# Patient Record
Sex: FEMALE | Race: WHITE | Employment: OTHER | URBAN - METROPOLITAN AREA
[De-identification: names, ages, dates, MRNs, and addresses within clinical notes are randomized per-mention and may not be internally consistent; named-entity substitution may affect disease eponyms.]

---

## 2017-01-05 ENCOUNTER — LAB REQUISITION (OUTPATIENT)
Dept: LAB | Facility: HOSPITAL | Age: 82
End: 2017-01-05
Attending: INTERNAL MEDICINE
Payer: MEDICARE

## 2017-01-05 DIAGNOSIS — Z79.01 LONG TERM CURRENT USE OF ANTICOAGULANT: ICD-10-CM

## 2017-01-05 DIAGNOSIS — I10 ESSENTIAL (PRIMARY) HYPERTENSION: ICD-10-CM

## 2017-01-05 PROCEDURE — 85610 PROTHROMBIN TIME: CPT | Performed by: INTERNAL MEDICINE

## 2017-01-06 LAB
POC ANTICOAG MACHINE PASS CONTROL: YES
POC INR: 2.6 (ref 0.8–1.3)

## 2017-01-13 ENCOUNTER — HOSPITAL ENCOUNTER (INPATIENT)
Facility: HOSPITAL | Age: 82
LOS: 3 days | Discharge: HOME | DRG: 292 | End: 2017-01-16
Attending: EMERGENCY MEDICINE | Admitting: HOSPITALIST
Payer: MEDICARE

## 2017-01-13 ENCOUNTER — APPOINTMENT (OUTPATIENT)
Dept: GENERAL RADIOLOGY | Facility: HOSPITAL | Age: 82
DRG: 292 | End: 2017-01-13
Payer: MEDICARE

## 2017-01-13 DIAGNOSIS — I50.9 ACUTE ON CHRONIC CONGESTIVE HEART FAILURE, UNSPECIFIED CONGESTIVE HEART FAILURE TYPE: Primary | ICD-10-CM

## 2017-01-13 PROBLEM — D69.6 THROMBOCYTOPENIA (HCC): Status: ACTIVE | Noted: 2017-01-13

## 2017-01-13 PROBLEM — E87.1 HYPONATREMIA: Status: ACTIVE | Noted: 2017-01-13

## 2017-01-13 PROBLEM — E87.6 HYPOKALEMIA: Status: ACTIVE | Noted: 2017-01-13

## 2017-01-13 PROBLEM — R73.9 HYPERGLYCEMIA: Status: ACTIVE | Noted: 2017-01-13

## 2017-01-13 LAB
ALBUMIN SERPL-MCNC: 3.3 G/DL (ref 3.5–4.8)
ALP LIVER SERPL-CCNC: 88 U/L (ref 55–142)
ALT SERPL-CCNC: 15 U/L (ref 14–54)
AST SERPL-CCNC: 14 U/L (ref 15–41)
ATRIAL RATE: 60 BPM
BASOPHIL % MANUAL: 0 %
BASOPHIL ABSOLUTE MANUAL: 0 X10(3) UL (ref 0–0.1)
BILIRUB SERPL-MCNC: 0.7 MG/DL (ref 0.1–2)
BUN BLD-MCNC: 21 MG/DL (ref 8–20)
CALCIUM BLD-MCNC: 9 MG/DL (ref 8.3–10.3)
CHLORIDE: 97 MMOL/L (ref 101–111)
CO2: 31 MMOL/L (ref 22–32)
CREAT BLD-MCNC: 0.86 MG/DL (ref 0.55–1.02)
EOSINOPHIL % MANUAL: 0 %
EOSINOPHIL ABSOLUTE MANUAL: 0 X10(3) UL (ref 0–0.3)
ERYTHROCYTE [DISTWIDTH] IN BLOOD BY AUTOMATED COUNT: 14.3 % (ref 11.5–16)
GLUCOSE BLD-MCNC: 120 MG/DL (ref 70–99)
HCT VFR BLD AUTO: 37.3 % (ref 34–50)
HGB BLD-MCNC: 13.1 G/DL (ref 12–16)
INR BLD: 1.94 (ref 0.89–1.12)
LYMPHOCYTE % MANUAL: 52 %
LYMPHOCYTE ABSOLUTE MANUAL: 5.25 X10(3) UL (ref 0.9–4)
M PROTEIN MFR SERPL ELPH: 6.5 G/DL (ref 6.1–8.3)
MCH RBC QN AUTO: 31 PG (ref 27–33.2)
MCHC RBC AUTO-ENTMCNC: 35.1 G/DL (ref 31–37)
MCV RBC AUTO: 88.4 FL (ref 81–100)
MONOCYTE % MANUAL: 4 %
MONOCYTE ABSOLUTE MANUAL: 0.4 X10(3) UL (ref 0.1–0.6)
NEUTROPHIL ABS PRELIM: 3.56 X10 (3) UL (ref 1.3–6.7)
NEUTROPHIL ABSOLUTE MANUAL: 4.44 X10(3) UL (ref 1.3–6.7)
NEUTROPHILS % MANUAL: 44 %
PLATELET # BLD AUTO: 105 10(3)UL (ref 150–450)
PLATELET MORPHOLOGY: NORMAL
POTASSIUM SERPL-SCNC: 3.5 MMOL/L (ref 3.6–5.1)
PSA SERPL DL<=0.01 NG/ML-MCNC: 22.8 SECONDS (ref 12.3–14.8)
Q-T INTERVAL: 430 MS
QRS DURATION: 104 MS
QTC CALCULATION (BEZET): 440 MS
R AXIS: 11 DEGREES
RBC # BLD AUTO: 4.22 X10(6)UL (ref 3.8–5.1)
RED CELL DISTRIBUTION WIDTH-SD: 45.9 FL (ref 35.1–46.3)
SODIUM SERPL-SCNC: 135 MMOL/L (ref 136–144)
T AXIS: -13 DEGREES
TOTAL CELLS COUNTED: 100
TROPONIN: <0.046 NG/ML (ref ?–0.05)
VENTRICULAR RATE: 63 BPM
WBC # BLD AUTO: 10.1 X10(3) UL (ref 4–13)

## 2017-01-13 PROCEDURE — 80053 COMPREHEN METABOLIC PANEL: CPT

## 2017-01-13 PROCEDURE — 85025 COMPLETE CBC W/AUTO DIFF WBC: CPT

## 2017-01-13 PROCEDURE — 71010 XR CHEST AP PORTABLE  (CPT=71010): CPT

## 2017-01-13 PROCEDURE — 84484 ASSAY OF TROPONIN QUANT: CPT

## 2017-01-13 PROCEDURE — 99285 EMERGENCY DEPT VISIT HI MDM: CPT

## 2017-01-13 PROCEDURE — 93005 ELECTROCARDIOGRAM TRACING: CPT

## 2017-01-13 PROCEDURE — 93010 ELECTROCARDIOGRAM REPORT: CPT

## 2017-01-13 PROCEDURE — 85007 BL SMEAR W/DIFF WBC COUNT: CPT

## 2017-01-13 PROCEDURE — 85027 COMPLETE CBC AUTOMATED: CPT

## 2017-01-13 PROCEDURE — 96374 THER/PROPH/DIAG INJ IV PUSH: CPT

## 2017-01-13 PROCEDURE — 85610 PROTHROMBIN TIME: CPT | Performed by: INTERNAL MEDICINE

## 2017-01-13 RX ORDER — OLMESARTAN MEDOXOMIL 40 MG/1
40 TABLET ORAL DAILY
COMMUNITY
End: 2017-09-15

## 2017-01-13 RX ORDER — ONDANSETRON 2 MG/ML
4 INJECTION INTRAMUSCULAR; INTRAVENOUS EVERY 6 HOURS PRN
Status: DISCONTINUED | OUTPATIENT
Start: 2017-01-13 | End: 2017-01-16

## 2017-01-13 RX ORDER — HYDROCHLOROTHIAZIDE 25 MG/1
25 TABLET ORAL DAILY
Status: DISCONTINUED | OUTPATIENT
Start: 2017-01-14 | End: 2017-01-16

## 2017-01-13 RX ORDER — WARFARIN SODIUM 6 MG/1
6 TABLET ORAL AS DIRECTED
COMMUNITY
End: 2017-03-25

## 2017-01-13 RX ORDER — FUROSEMIDE 10 MG/ML
20 INJECTION INTRAMUSCULAR; INTRAVENOUS
Status: DISCONTINUED | OUTPATIENT
Start: 2017-01-13 | End: 2017-01-16

## 2017-01-13 RX ORDER — PAROXETINE 10 MG/1
10 TABLET, FILM COATED ORAL NIGHTLY
COMMUNITY
End: 2017-03-01

## 2017-01-13 RX ORDER — WARFARIN SODIUM 4 MG/1
4 TABLET ORAL DAILY
Status: ON HOLD | COMMUNITY
End: 2017-09-25

## 2017-01-13 RX ORDER — DILTIAZEM HYDROCHLORIDE 180 MG/1
180 CAPSULE, COATED, EXTENDED RELEASE ORAL DAILY
COMMUNITY
End: 2017-03-01

## 2017-01-13 RX ORDER — WARFARIN SODIUM 2 MG/1
4 TABLET ORAL ONCE
Status: COMPLETED | OUTPATIENT
Start: 2017-01-13 | End: 2017-01-13

## 2017-01-13 RX ORDER — POTASSIUM CHLORIDE 20 MEQ/1
40 TABLET, EXTENDED RELEASE ORAL EVERY 4 HOURS
Status: COMPLETED | OUTPATIENT
Start: 2017-01-13 | End: 2017-01-13

## 2017-01-13 RX ORDER — TRAZODONE HYDROCHLORIDE 50 MG/1
100 TABLET ORAL NIGHTLY
COMMUNITY
End: 2017-03-01

## 2017-01-13 RX ORDER — ACETAMINOPHEN 325 MG/1
650 TABLET ORAL EVERY 6 HOURS PRN
Status: DISCONTINUED | OUTPATIENT
Start: 2017-01-13 | End: 2017-01-16

## 2017-01-13 RX ORDER — POLYETHYLENE GLYCOL 3350 17 G/17G
17 POWDER, FOR SOLUTION ORAL DAILY PRN
Status: DISCONTINUED | OUTPATIENT
Start: 2017-01-13 | End: 2017-01-16

## 2017-01-13 RX ORDER — HYDRALAZINE HYDROCHLORIDE 20 MG/ML
10 INJECTION INTRAMUSCULAR; INTRAVENOUS EVERY 6 HOURS PRN
Status: DISCONTINUED | OUTPATIENT
Start: 2017-01-13 | End: 2017-01-14

## 2017-01-13 RX ORDER — HYDROCHLOROTHIAZIDE 25 MG/1
25 TABLET ORAL DAILY
Status: ON HOLD | COMMUNITY
End: 2017-01-16

## 2017-01-13 RX ORDER — FUROSEMIDE 10 MG/ML
80 INJECTION INTRAMUSCULAR; INTRAVENOUS ONCE
Status: COMPLETED | OUTPATIENT
Start: 2017-01-13 | End: 2017-01-13

## 2017-01-13 RX ORDER — VIT A/VIT C/VIT E/ZINC/COPPER 2148-113
1 TABLET ORAL 2 TIMES DAILY
Status: ON HOLD | COMMUNITY
End: 2018-01-01

## 2017-01-13 RX ORDER — VALSARTAN 320 MG/1
160 TABLET ORAL 2 TIMES DAILY
Status: DISCONTINUED | OUTPATIENT
Start: 2017-01-13 | End: 2017-01-16

## 2017-01-13 RX ORDER — DILTIAZEM HYDROCHLORIDE 180 MG/1
180 CAPSULE, EXTENDED RELEASE ORAL DAILY
Status: DISCONTINUED | OUTPATIENT
Start: 2017-01-14 | End: 2017-01-16

## 2017-01-13 NOTE — ED PROVIDER NOTES
Patient Seen in: [facility] Emergency Department    History   Patient presents with:  Dyspnea NORMAN SOB (respiratory)    Stated Complaint: Shortness of breath    HPI    30-year-old female that comes the hospital the chief complaint of having difficult Tab,  TAKE ONE TABLET BY MOUTH TWICE DAILY. Warfarin Sodium (COUMADIN) 4 MG Oral Tab,  TAKE 1& 1/2 TABLETS BY MOUTH DAILY OR AS DIRECTED BY ANTICOAGULATION CLINIC.    CARTIA  MG Oral Capsule SR 24 Hr,  TAKE ONE CAPSULE BY MOUTH DAILY   predniSONE (D tenderness  Extremity no clubbing, cyanosis or edema noted.   Full range of motion noted without tenderness  Neuro: No focal deficits noted    ED Course     Labs Reviewed   COMP METABOLIC PANEL (14) - Abnormal; Notable for the following:     Glucose 120 (*) was obtained.     COMPARISON:  [facility] , CHEST AP PORT, 2/10/2012, 15:03.  [facility] , CHEST PA   LATERAL, 3/03/2009, 9:44.     INDICATIONS:  Shortness of breath     PATIENT STATED HISTORY:  Shortness of breath for two months.          FINDINGS: Rosa Isela knapp card

## 2017-01-13 NOTE — PROGRESS NOTES
Brief Internal Medicine Note    Full Note to Follow      Pt is a 81 yo with HTN/HL, afib, moderate MR, CLL, osteoporosis, who is admitted for GATES. Pt has been sob for the past few months and has been unchanged.   Per daughter, she just found about it now

## 2017-01-13 NOTE — ED INITIAL ASSESSMENT (HPI)
Pt c/o SOB for two months pt went to PCP who ordered labs and cxr and bnp was 3,000 pt sees DR. Waleska Lee for card

## 2017-01-13 NOTE — H&P
[facility] Hospitalist H&P       CC: sob    PCP: Tawanna Méndez MD    History of Present Illness:     Pt is a 79 yo with HTN/HL, afib, moderate MR, CLL, osteoporosis, who is admitted for GATES.  Pt has been sob for the past few months and has been unchanged.   chest pain, syncope.        OBJECTIVE:  /72 mmHg  Pulse 68  Temp(Src) 98.3 °F (36.8 °C) (Temporal)  Resp 18  Ht 162.6 cm (5' 4\")  Wt 160 lb (72.576 kg)  BMI 27.45 kg/m2  SpO2 94%  General:  Alert, NAD, appears stated age   Head:  Normocephalic, witho Senescent changes are redemonstrated along the thoracic aorta. Small right-sided pleural effusion appears new. The remainder of the lungs are clear. 1/12/2017  IMPRESSION: Small right-sided pleural effusion, new since previous study.   Clinical correlat coumadin        Outpatient records or previous hospital records reviewed. Further recommendations pending patient's clinical course.   [facility] hospitalist to continue to follow patient while in house    Patient and/or patient's family given opportunity to a

## 2017-01-13 NOTE — ED NOTES
Patient and patient daughter updated with plan of care and waiting for transport, pt ambulated to restroom at this time with assistaince, NAD noted.

## 2017-01-14 ENCOUNTER — APPOINTMENT (OUTPATIENT)
Dept: CV DIAGNOSTICS | Facility: HOSPITAL | Age: 82
DRG: 292 | End: 2017-01-14
Attending: INTERNAL MEDICINE
Payer: MEDICARE

## 2017-01-14 LAB
BASOPHILS # BLD AUTO: 0.04 X10(3) UL (ref 0–0.1)
BASOPHILS NFR BLD AUTO: 0.4 %
BUN BLD-MCNC: 17 MG/DL (ref 8–20)
CALCIUM BLD-MCNC: 9.1 MG/DL (ref 8.3–10.3)
CHLORIDE: 99 MMOL/L (ref 101–111)
CO2: 30 MMOL/L (ref 22–32)
CREAT BLD-MCNC: 0.64 MG/DL (ref 0.55–1.02)
EOSINOPHIL # BLD AUTO: 0.05 X10(3) UL (ref 0–0.3)
EOSINOPHIL NFR BLD AUTO: 0.5 %
ERYTHROCYTE [DISTWIDTH] IN BLOOD BY AUTOMATED COUNT: 14.4 % (ref 11.5–16)
GLUCOSE BLD-MCNC: 108 MG/DL (ref 70–99)
HAV IGM SER QL: 2 MG/DL (ref 1.7–3)
HCT VFR BLD AUTO: 41.1 % (ref 34–50)
HGB BLD-MCNC: 14.2 G/DL (ref 12–16)
IMMATURE GRANULOCYTE COUNT: 0.05 X10(3) UL (ref 0–1)
IMMATURE GRANULOCYTE RATIO %: 0.5 %
INR BLD: 2.05 (ref 0.89–1.12)
LYMPHOCYTES # BLD AUTO: 6.18 X10(3) UL (ref 0.9–4)
LYMPHOCYTES NFR BLD AUTO: 57.9 %
MCH RBC QN AUTO: 30.4 PG (ref 27–33.2)
MCHC RBC AUTO-ENTMCNC: 34.5 G/DL (ref 31–37)
MCV RBC AUTO: 88 FL (ref 81–100)
MONOCYTES # BLD AUTO: 0.81 X10(3) UL (ref 0.1–0.6)
MONOCYTES NFR BLD AUTO: 7.6 %
NEUTROPHIL ABS PRELIM: 3.55 X10 (3) UL (ref 1.3–6.7)
NEUTROPHILS # BLD AUTO: 3.55 X10(3) UL (ref 1.3–6.7)
NEUTROPHILS NFR BLD AUTO: 33.1 %
PLATELET # BLD AUTO: 113 10(3)UL (ref 150–450)
POTASSIUM SERPL-SCNC: 3.6 MMOL/L (ref 3.6–5.1)
POTASSIUM SERPL-SCNC: 4.3 MMOL/L (ref 3.6–5.1)
PSA SERPL DL<=0.01 NG/ML-MCNC: 23.9 SECONDS (ref 12.3–14.8)
RBC # BLD AUTO: 4.67 X10(6)UL (ref 3.8–5.1)
RED CELL DISTRIBUTION WIDTH-SD: 46.2 FL (ref 35.1–46.3)
SODIUM SERPL-SCNC: 138 MMOL/L (ref 136–144)
WBC # BLD AUTO: 10.7 X10(3) UL (ref 4–13)

## 2017-01-14 PROCEDURE — 83735 ASSAY OF MAGNESIUM: CPT | Performed by: HOSPITALIST

## 2017-01-14 PROCEDURE — 85610 PROTHROMBIN TIME: CPT | Performed by: HOSPITALIST

## 2017-01-14 PROCEDURE — 80048 BASIC METABOLIC PNL TOTAL CA: CPT | Performed by: INTERNAL MEDICINE

## 2017-01-14 PROCEDURE — 84132 ASSAY OF SERUM POTASSIUM: CPT | Performed by: INTERNAL MEDICINE

## 2017-01-14 PROCEDURE — 85025 COMPLETE CBC W/AUTO DIFF WBC: CPT | Performed by: HOSPITALIST

## 2017-01-14 RX ORDER — POTASSIUM CHLORIDE 20 MEQ/1
40 TABLET, EXTENDED RELEASE ORAL EVERY 4 HOURS
Status: COMPLETED | OUTPATIENT
Start: 2017-01-14 | End: 2017-01-14

## 2017-01-14 RX ORDER — HYDRALAZINE HYDROCHLORIDE 25 MG/1
25 TABLET, FILM COATED ORAL EVERY 8 HOURS SCHEDULED
Status: DISCONTINUED | OUTPATIENT
Start: 2017-01-14 | End: 2017-01-16

## 2017-01-14 NOTE — PLAN OF CARE
Problem: METABOLIC/FLUID AND ELECTROLYTES - ADULT  Goal: Electrolytes maintained within normal limits  INTERVENTIONS:  - Monitor labs and rhythm and assess patient for signs and symptoms of electrolyte imbalances  - Administer electrolyte replacement as or

## 2017-01-14 NOTE — DIETARY NOTE
Nutrition Short Note    Received consult for heart failure diet education. Met with pt and her daughter. Pt reports fair to good appetite.   She lives in an assisted living facility and prepares her own breakfast and lunch which are low in sodium and then

## 2017-01-14 NOTE — PROGRESS NOTES
[facility] Dosing Service  Warfarin (Coumadin) Subsequent Dosing    Mauricio Batres is a 80year old female for whom pharmacy has been dosing warfarin (Coumadin).  Goal INR is 2-3    Recent Labs   Lab  01/13/17   1937  01/14/17   0540   INR  1.94*  2.05*

## 2017-01-14 NOTE — CM/SW NOTE
SW spoke with patient to assess. Patient identified she lives at [address] [place] [address] on the second floor (there is an elevator). Patient uses a standard walker at home.   Patient reports being independent in ADL's prior to admission, stating, \"I can do ev

## 2017-01-14 NOTE — CONSULTS
[facility]  Report of Consultation    Kaylynn Noonan Patient Status:  Observation    1924 MRN [id number]   [facility] 2NE-A Attending Ml Ambrocio MD   Hosp Day # 0 PCP Donell Singleton MD     Reason for Consultation:  SOB \"shallow breathing\". She finds it difficult to describe any further than this, denying that it is air hunger. She has had no orthopnea, PND. She believes she may have some right ankle edema.   She mentioned it to her daughter this week, who brought her magnesium hydroxide (MILK OF MAGNESIA) 400 MG/5ML suspension 30 mL, 30 mL, Oral, Daily PRN  •  ondansetron HCl (ZOFRAN) injection 4 mg, 4 mg, Intravenous, Q6H PRN  •  Potassium Chloride ER (K-DUR M20) CR tab 40 mEq, 40 mEq, Oral, Q4H    Review of Systems: PT 34.3* 02/13/2012   PT 33.7* 02/12/2012   INR 2.6* 01/05/2017   INR 4.2* 12/15/2016   INR 3.36* 12/07/2016           Katelyn Garcia MD  1/13/2017  6:13 PM

## 2017-01-14 NOTE — CONSULTS
[facility] Dosing Service  Warfarin (Coumadin) Initial Dosing    Jerrod Mcclelland is a 80year old female for whom pharmacy has been consulted to dose warfarin (COUMADIN) for atrial fibrillation by Dr. Lisbeth Casiano. Based on this indication, goal INR is 2-3.

## 2017-01-14 NOTE — PROGRESS NOTES
[facility] Hospitalist Progress Note     PCP: Kendal Green MD    CC:  Follow up    SUBJECTIVE:  Pt sitting up in bed, did not sleep well overnight d/t uncomfortable bed. No current cp. No sob at rest, has not walked yet. No n/v/f/c.   Diuresed overnight and 9.0  9.1   MG   --    --   2.0   GLU  111*  120*  108*       Recent Labs   Lab  01/13/17   1503   ALT  15   AST  14*   ALB  3.3*       No results for input(s): PGLU in the last 72 hours.     Recent Labs   Lab  01/13/17   1503   TROP  <0.046         Meds:

## 2017-01-14 NOTE — PLAN OF CARE
Maintains optimal cardiac output and hemodynamic stability Progressing      Absence of cardiac arrhythmias or at baseline Progressing      Achieve highest/safest level of independence in self care Progressing      Electrolytes maintained within normal limi

## 2017-01-14 NOTE — PLAN OF CARE
Problem: CARDIOVASCULAR - ADULT  Goal: Maintains optimal cardiac output and hemodynamic stability  INTERVENTIONS:  - Monitor vital signs, rhythm, and trends  - Monitor for bleeding, hypotension and signs of decreased cardiac output  - Evaluate effectivenes imbalances  - Administer electrolyte replacement as ordered  - Monitor response to electrolyte replacements, including rhythm and repeat lab results as appropriate  - Fluid restriction as ordered  - Instruct patient on fluid and nutrition restrictions as a

## 2017-01-14 NOTE — PLAN OF CARE
Problem: CARDIOVASCULAR - ADULT  Goal: Absence of cardiac arrhythmias or at baseline  INTERVENTIONS:  - Continuous cardiac monitoring, monitor vital signs, obtain 12 lead EKG if indicated  - Initiate emergency measures for life threatening arrhythmias  - M

## 2017-01-14 NOTE — PROGRESS NOTES
[facility] Cardiology Progress Note - German Model Patient Status:  Observation    1924 MRN [id number]   [facility] 2NE-A Attending Debra Aquino MD   Hosp Day # 1 PCP Nicholas Pappas MD     Subjective:   The pa mmHg    Intake/Output:     Intake/Output Summary (Last 24 hours) at 01/14/17 0729  Last data filed at 01/14/17 0442   Gross per 24 hour   Intake    300 ml   Output   2600 ml   Net  -2300 ml       Last 3 Weights  01/14/17 0442 : 152 lb 5.4 oz (69.1 kg)  01/ Allergies    Medications:    Current Facility-Administered Medications:  acetaminophen (TYLENOL) tab 650 mg 650 mg Oral Q6H PRN   PEG 3350 (MIRALAX) powder packet 17 g 17 g Oral Daily PRN   magnesium hydroxide (MILK OF MAGNESIA) 400 MG/5ML suspension 30 mL

## 2017-01-15 ENCOUNTER — APPOINTMENT (OUTPATIENT)
Dept: CV DIAGNOSTICS | Facility: HOSPITAL | Age: 82
DRG: 292 | End: 2017-01-15
Attending: INTERNAL MEDICINE
Payer: MEDICARE

## 2017-01-15 LAB
BUN BLD-MCNC: 17 MG/DL (ref 8–20)
CALCIUM BLD-MCNC: 8.8 MG/DL (ref 8.3–10.3)
CHLORIDE: 99 MMOL/L (ref 101–111)
CO2: 29 MMOL/L (ref 22–32)
CREAT BLD-MCNC: 0.79 MG/DL (ref 0.55–1.02)
GLUCOSE BLD-MCNC: 100 MG/DL (ref 70–99)
INR BLD: 2.35 (ref 0.89–1.12)
POTASSIUM SERPL-SCNC: 3.9 MMOL/L (ref 3.6–5.1)
PSA SERPL DL<=0.01 NG/ML-MCNC: 26.6 SECONDS (ref 12.3–14.8)
SODIUM SERPL-SCNC: 135 MMOL/L (ref 136–144)

## 2017-01-15 PROCEDURE — 93306 TTE W/DOPPLER COMPLETE: CPT

## 2017-01-15 PROCEDURE — 80048 BASIC METABOLIC PNL TOTAL CA: CPT | Performed by: INTERNAL MEDICINE

## 2017-01-15 PROCEDURE — 85610 PROTHROMBIN TIME: CPT | Performed by: HOSPITALIST

## 2017-01-15 PROCEDURE — 93306 TTE W/DOPPLER COMPLETE: CPT | Performed by: INTERNAL MEDICINE

## 2017-01-15 RX ORDER — TRAZODONE HYDROCHLORIDE 50 MG/1
25 TABLET ORAL ONCE
Status: COMPLETED | OUTPATIENT
Start: 2017-01-15 | End: 2017-01-15

## 2017-01-15 RX ORDER — WARFARIN SODIUM 2 MG/1
4 TABLET ORAL
Status: COMPLETED | OUTPATIENT
Start: 2017-01-15 | End: 2017-01-15

## 2017-01-15 NOTE — PROGRESS NOTES
[facility] Cardiology Progress Note - [place] Model Patient Status:  Inpatient    1924 MRN [id number]   [facility] 2NE-A Attending Debra Aquino MD   Hosp Day # 2 PCP Nicholas Pappas MD     Subjective:   The brad kg)      Tele: NSR    Physical Exam:    General: Alert and oriented x 3. No apparent distress. No respiratory or constitutional distress. HEENT: Normocephalic, anicteric sclera, neck supple, no thyromegaly or adenopathy.   Neck: No JVD, carotids 2+, no bru Oral Q6H PRN   PEG 3350 (MIRALAX) powder packet 17 g 17 g Oral Daily PRN   magnesium hydroxide (MILK OF MAGNESIA) 400 MG/5ML suspension 30 mL 30 mL Oral Daily PRN   ondansetron HCl (ZOFRAN) injection 4 mg 4 mg Intravenous Q6H PRN   furosemide (LASIX) injec

## 2017-01-15 NOTE — PROGRESS NOTES
[facility] Dosing Service  Warfarin (Coumadin) Subsequent Dosing    Marilu Leslie is a 80year old female for whom pharmacy has been dosing warfarin (Coumadin).  Goal INR is 2-3    Recent Labs   Lab  01/13/17   1937  01/14/17   0540  01/15/17   0599

## 2017-01-15 NOTE — PLAN OF CARE
Problem: CARDIOVASCULAR - ADULT  Goal: Maintains optimal cardiac output and hemodynamic stability  INTERVENTIONS:  - Monitor vital signs, rhythm, and trends  - Monitor for bleeding, hypotension and signs of decreased cardiac output  - Evaluate effectivenes replacements, including rhythm and repeat lab results as appropriate  - Fluid restriction as ordered  - Instruct patient on fluid and nutrition restrictions as appropriate   Outcome: Progressing    Problem: Impaired Activities of Daily Living  Goal: Achiev

## 2017-01-15 NOTE — PROGRESS NOTES
[facility] Hospitalist Progress Note                                                                   [facility]  1/17/1924    CC: FU SOB    Interval History:  - Breathing not yet back to ba BUN  21*  17   --   17   CREATSERUM  0.86  0.64   --   0.79   CA  9.0  9.1   --   8.8   NA  135*  138   --   135*   K  3.5*  3.6  4.3  3.9   CL  97*  99*   --   99*   CO2  31.0  30.0   --   29.0       ROS: no change to ROS from my documentation yesterday

## 2017-01-15 NOTE — CERTIFICATION
**Certification    PHYSICIAN Certification of Need for Inpatient Hospitalization    Based on the her current state of illness, Lauro requires inpatient hospitalization for her CHF.   This requires inpatient medical treatment because the patient's s

## 2017-01-16 VITALS
HEIGHT: 64 IN | OXYGEN SATURATION: 96 % | TEMPERATURE: 98 F | DIASTOLIC BLOOD PRESSURE: 94 MMHG | BODY MASS INDEX: 25.82 KG/M2 | RESPIRATION RATE: 16 BRPM | WEIGHT: 151.25 LBS | HEART RATE: 67 BPM | SYSTOLIC BLOOD PRESSURE: 137 MMHG

## 2017-01-16 LAB
BUN BLD-MCNC: 31 MG/DL (ref 8–20)
CALCIUM BLD-MCNC: 8.9 MG/DL (ref 8.3–10.3)
CHLORIDE: 96 MMOL/L (ref 101–111)
CO2: 33 MMOL/L (ref 22–32)
CREAT BLD-MCNC: 1.12 MG/DL (ref 0.55–1.02)
GLUCOSE BLD-MCNC: 99 MG/DL (ref 70–99)
INR BLD: 2.44 (ref 0.89–1.12)
POTASSIUM SERPL-SCNC: 3.6 MMOL/L (ref 3.6–5.1)
PSA SERPL DL<=0.01 NG/ML-MCNC: 27.4 SECONDS (ref 12.3–14.8)
SODIUM SERPL-SCNC: 136 MMOL/L (ref 136–144)

## 2017-01-16 PROCEDURE — 80048 BASIC METABOLIC PNL TOTAL CA: CPT | Performed by: INTERNAL MEDICINE

## 2017-01-16 PROCEDURE — 85610 PROTHROMBIN TIME: CPT | Performed by: INTERNAL MEDICINE

## 2017-01-16 RX ORDER — FUROSEMIDE 20 MG/1
20 TABLET ORAL DAILY
Qty: 90 TABLET | Refills: 1 | Status: SHIPPED | OUTPATIENT
Start: 2017-01-16 | End: 2017-06-10

## 2017-01-16 RX ORDER — WARFARIN SODIUM 2 MG/1
4 TABLET ORAL
Status: DISCONTINUED | OUTPATIENT
Start: 2017-01-16 | End: 2017-01-16

## 2017-01-16 RX ORDER — POTASSIUM CHLORIDE 20 MEQ/1
40 TABLET, EXTENDED RELEASE ORAL EVERY 4 HOURS
Status: COMPLETED | OUTPATIENT
Start: 2017-01-16 | End: 2017-01-16

## 2017-01-16 NOTE — CM/SW NOTE
Patient discussed with RN. Per RN, anticipate patient will be ready for discharge home later today. Patient live in an independent apartment at [address] [place] [address]. No discharge needs identified.     [place], [facility]

## 2017-01-16 NOTE — PROGRESS NOTES
[facility] Dosing Service  Warfarin (Coumadin) Subsequent Dosing    Art Ordaz is a 80year old female for whom pharmacy has been dosing warfarin (Coumadin).  Goal INR is 2-3    Recent Labs   Lab  01/13/17   1937  01/14/17   0540  01/15/17   0544  0

## 2017-01-16 NOTE — DISCHARGE SUMMARY
General Medicine Discharge Summary     Patient ID:  Anabell Granados  80year old  1/17/1924    Admit date: 1/13/2017    Discharge date and time:  1/16/17    Attending Physician: Raissa Mcgrath, CONSULT TO SOCIAL WORK  IP CONSULT TO CARDIAC REHAB    Operative Procedures:         Patient instructions:      Current Discharge Medication List    START taking these medications    furosemide 20 MG Oral Tab  Take 1 tablet (20 mg total) by mouth daily.

## 2017-01-16 NOTE — PLAN OF CARE
Patient is alert and oriented. Saturates well on room air. Controlled Afib on the monitor, currently on coumadin. Denies any pain or SOB. Ambulates with stand by assist. Call light within reach, will continue to monitor.      CARDIOVASCULAR - ADULT    • Domonique

## 2017-01-16 NOTE — PROGRESS NOTES
[facility] Cardiology Progress Note - Jam German Patient Status:  Inpatient    1924 MRN [id number]   [facility] 2NE-A Attending Estevan Reddy,*   Hosp Day # 3 PCP Jamisno Wnag MD     Subjective: oz (72.666 kg)  10/28/15 1501 : 167 lb 8 oz (75.978 kg)      Tele: NSR    Physical Exam:    General: Alert and oriented x 3. No apparent distress. No respiratory or constitutional distress.   HEENT: Normocephalic, anicteric sclera, neck supple, no thyromega Phenylephrine-Mineral Oil-Pet (FORMULATION R) rectal ointment  Rectal BID   acetaminophen (TYLENOL) tab 650 mg 650 mg Oral Q6H PRN   PEG 3350 (MIRALAX) powder packet 17 g 17 g Oral Daily PRN   magnesium hydroxide (MILK OF MAGNESIA) 400 MG/5ML suspension

## 2017-01-17 NOTE — CM/SW NOTE
01/17/17 0800   Discharge disposition   Discharged to: Home or [address] [place] [address] after discharge None   Patient assessed for rehabilitation services?  Yes   Discharge transportation Private car   Patient discharged 1/16/17

## 2017-01-23 PROBLEM — R53.83 FATIGUE: Status: ACTIVE | Noted: 2017-01-23

## 2017-02-01 ENCOUNTER — LAB REQUISITION (OUTPATIENT)
Dept: LAB | Facility: HOSPITAL | Age: 82
End: 2017-02-01
Attending: INTERNAL MEDICINE
Payer: MEDICARE

## 2017-02-01 DIAGNOSIS — I50.9 HEART FAILURE (HCC): ICD-10-CM

## 2017-02-01 DIAGNOSIS — R53.82 CHRONIC FATIGUE: ICD-10-CM

## 2017-02-01 LAB
BUN BLD-MCNC: 13 MG/DL (ref 8–20)
CALCIUM BLD-MCNC: 8.9 MG/DL (ref 8.3–10.3)
CHLORIDE: 100 MMOL/L (ref 101–111)
CO2: 30 MMOL/L (ref 22–32)
CREAT BLD-MCNC: 1.14 MG/DL (ref 0.55–1.02)
GLUCOSE BLD-MCNC: 112 MG/DL (ref 70–99)
POTASSIUM SERPL-SCNC: 3.8 MMOL/L (ref 3.6–5.1)
SODIUM SERPL-SCNC: 135 MMOL/L (ref 136–144)
TSI SER-ACNC: 3.55 MIU/ML (ref 0.35–5.5)

## 2017-02-01 PROCEDURE — 36415 COLL VENOUS BLD VENIPUNCTURE: CPT | Performed by: INTERNAL MEDICINE

## 2017-02-01 PROCEDURE — 84443 ASSAY THYROID STIM HORMONE: CPT | Performed by: INTERNAL MEDICINE

## 2017-02-01 PROCEDURE — 80048 BASIC METABOLIC PNL TOTAL CA: CPT | Performed by: INTERNAL MEDICINE

## 2017-02-16 ENCOUNTER — LAB REQUISITION (OUTPATIENT)
Dept: LAB | Facility: HOSPITAL | Age: 82
End: 2017-02-16
Attending: INTERNAL MEDICINE
Payer: MEDICARE

## 2017-02-16 DIAGNOSIS — Z79.01 LONG TERM CURRENT USE OF ANTICOAGULANT: ICD-10-CM

## 2017-02-16 DIAGNOSIS — I48.91 ATRIAL FIBRILLATION (HCC): ICD-10-CM

## 2017-02-16 DIAGNOSIS — Z51.81 ENCOUNTER FOR THERAPEUTIC DRUG LEVEL MONITORING: ICD-10-CM

## 2017-02-16 LAB
POC ANTICOAG MACHINE PASS CONTROL: YES
POC INR: 3.5 (ref 0.8–1.3)

## 2017-02-16 PROCEDURE — 85610 PROTHROMBIN TIME: CPT | Performed by: INTERNAL MEDICINE

## 2017-03-01 ENCOUNTER — LAB REQUISITION (OUTPATIENT)
Dept: LAB | Facility: HOSPITAL | Age: 82
End: 2017-03-01
Attending: NURSE PRACTITIONER
Payer: MEDICARE

## 2017-03-01 DIAGNOSIS — E78.2 MIXED HYPERLIPIDEMIA: ICD-10-CM

## 2017-03-01 DIAGNOSIS — I48.20 CHRONIC ATRIAL FIBRILLATION (HCC): ICD-10-CM

## 2017-03-01 DIAGNOSIS — I10 ESSENTIAL (PRIMARY) HYPERTENSION: ICD-10-CM

## 2017-03-01 DIAGNOSIS — I34.0 NONRHEUMATIC MITRAL VALVE INSUFFICIENCY: ICD-10-CM

## 2017-03-01 DIAGNOSIS — Z51.81 ENCOUNTER FOR THERAPEUTIC DRUG LEVEL MONITORING: ICD-10-CM

## 2017-03-01 PROBLEM — R73.9 HYPERGLYCEMIA: Status: RESOLVED | Noted: 2017-01-13 | Resolved: 2017-03-01

## 2017-03-01 PROBLEM — R53.83 FATIGUE: Status: RESOLVED | Noted: 2017-01-23 | Resolved: 2017-03-01

## 2017-03-01 PROBLEM — E87.6 HYPOKALEMIA: Status: RESOLVED | Noted: 2017-01-13 | Resolved: 2017-03-01

## 2017-03-01 PROBLEM — E87.1 HYPONATREMIA: Status: RESOLVED | Noted: 2017-01-13 | Resolved: 2017-03-01

## 2017-03-01 LAB
BUN BLD-MCNC: 14 MG/DL (ref 8–20)
CALCIUM BLD-MCNC: 9.1 MG/DL (ref 8.3–10.3)
CHLORIDE: 102 MMOL/L (ref 101–111)
CO2: 29 MMOL/L (ref 22–32)
CREAT BLD-MCNC: 0.83 MG/DL (ref 0.55–1.02)
GLUCOSE BLD-MCNC: 94 MG/DL (ref 70–99)
POTASSIUM SERPL-SCNC: 3.9 MMOL/L (ref 3.6–5.1)
SODIUM SERPL-SCNC: 139 MMOL/L (ref 136–144)

## 2017-03-01 PROCEDURE — 36415 COLL VENOUS BLD VENIPUNCTURE: CPT | Performed by: NURSE PRACTITIONER

## 2017-03-01 PROCEDURE — 80048 BASIC METABOLIC PNL TOTAL CA: CPT | Performed by: NURSE PRACTITIONER

## 2017-03-08 ENCOUNTER — LAB REQUISITION (OUTPATIENT)
Dept: LAB | Facility: HOSPITAL | Age: 82
End: 2017-03-08
Attending: INTERNAL MEDICINE
Payer: MEDICARE

## 2017-03-08 DIAGNOSIS — R69 ILLNESS: ICD-10-CM

## 2017-03-08 LAB
INR BLD: 3.6 (ref 0.89–1.11)
PSA SERPL DL<=0.01 NG/ML-MCNC: 36.8 SECONDS (ref 12–14.3)

## 2017-03-08 PROCEDURE — 85610 PROTHROMBIN TIME: CPT | Performed by: INTERNAL MEDICINE

## 2017-03-16 ENCOUNTER — LAB REQUISITION (OUTPATIENT)
Dept: LAB | Facility: HOSPITAL | Age: 82
End: 2017-03-16
Attending: INTERNAL MEDICINE
Payer: MEDICARE

## 2017-03-16 DIAGNOSIS — I48.91 ATRIAL FIBRILLATION (HCC): ICD-10-CM

## 2017-03-16 DIAGNOSIS — Z79.01 LONG TERM CURRENT USE OF ANTICOAGULANT: ICD-10-CM

## 2017-03-16 DIAGNOSIS — Z51.81 ENCOUNTER FOR THERAPEUTIC DRUG LEVEL MONITORING: ICD-10-CM

## 2017-03-16 LAB
POC ANTICOAG MACHINE PASS CONTROL: YES
POC INR: 3.3 (ref 0.8–1.3)

## 2017-03-16 PROCEDURE — 85610 PROTHROMBIN TIME: CPT | Performed by: INTERNAL MEDICINE

## 2017-03-25 ENCOUNTER — APPOINTMENT (OUTPATIENT)
Dept: GENERAL RADIOLOGY | Facility: HOSPITAL | Age: 82
DRG: 392 | End: 2017-03-25
Attending: EMERGENCY MEDICINE
Payer: MEDICARE

## 2017-03-25 ENCOUNTER — APPOINTMENT (OUTPATIENT)
Dept: CT IMAGING | Facility: HOSPITAL | Age: 82
DRG: 392 | End: 2017-03-25
Attending: EMERGENCY MEDICINE
Payer: MEDICARE

## 2017-03-25 ENCOUNTER — HOSPITAL ENCOUNTER (INPATIENT)
Facility: HOSPITAL | Age: 82
LOS: 2 days | Discharge: HOME HEALTH | DRG: 392 | End: 2017-03-27
Attending: EMERGENCY MEDICINE | Admitting: HOSPITALIST
Payer: MEDICARE

## 2017-03-25 DIAGNOSIS — K57.92 ACUTE DIVERTICULITIS: Primary | ICD-10-CM

## 2017-03-25 PROBLEM — Z91.81 AT RISK FOR FALLING: Status: ACTIVE | Noted: 2017-03-25

## 2017-03-25 PROCEDURE — 96375 TX/PRO/DX INJ NEW DRUG ADDON: CPT

## 2017-03-25 PROCEDURE — 80053 COMPREHEN METABOLIC PANEL: CPT | Performed by: EMERGENCY MEDICINE

## 2017-03-25 PROCEDURE — 74177 CT ABD & PELVIS W/CONTRAST: CPT

## 2017-03-25 PROCEDURE — 99285 EMERGENCY DEPT VISIT HI MDM: CPT

## 2017-03-25 PROCEDURE — 85610 PROTHROMBIN TIME: CPT | Performed by: HOSPITALIST

## 2017-03-25 PROCEDURE — 83690 ASSAY OF LIPASE: CPT | Performed by: EMERGENCY MEDICINE

## 2017-03-25 PROCEDURE — 85025 COMPLETE CBC W/AUTO DIFF WBC: CPT | Performed by: EMERGENCY MEDICINE

## 2017-03-25 PROCEDURE — 81001 URINALYSIS AUTO W/SCOPE: CPT | Performed by: EMERGENCY MEDICINE

## 2017-03-25 PROCEDURE — 87086 URINE CULTURE/COLONY COUNT: CPT | Performed by: EMERGENCY MEDICINE

## 2017-03-25 PROCEDURE — 96365 THER/PROPH/DIAG IV INF INIT: CPT

## 2017-03-25 PROCEDURE — 71010 XR CHEST AP PORTABLE  (CPT=71010): CPT

## 2017-03-25 RX ORDER — PAROXETINE 10 MG/1
10 TABLET, FILM COATED ORAL NIGHTLY
Status: DISCONTINUED | OUTPATIENT
Start: 2017-03-25 | End: 2017-03-27

## 2017-03-25 RX ORDER — ACETAMINOPHEN 325 MG/1
650 TABLET ORAL EVERY 6 HOURS PRN
Status: DISCONTINUED | OUTPATIENT
Start: 2017-03-25 | End: 2017-03-27

## 2017-03-25 RX ORDER — LOSARTAN POTASSIUM 100 MG/1
100 TABLET ORAL DAILY
Status: DISCONTINUED | OUTPATIENT
Start: 2017-03-26 | End: 2017-03-27

## 2017-03-25 RX ORDER — METRONIDAZOLE 500 MG/100ML
500 INJECTION, SOLUTION INTRAVENOUS EVERY 8 HOURS
Status: DISCONTINUED | OUTPATIENT
Start: 2017-03-26 | End: 2017-03-27

## 2017-03-25 RX ORDER — DILTIAZEM HYDROCHLORIDE 180 MG/1
180 CAPSULE, EXTENDED RELEASE ORAL DAILY
Status: DISCONTINUED | OUTPATIENT
Start: 2017-03-26 | End: 2017-03-27

## 2017-03-25 RX ORDER — MORPHINE SULFATE 2 MG/ML
2 INJECTION, SOLUTION INTRAMUSCULAR; INTRAVENOUS EVERY 2 HOUR PRN
Status: DISCONTINUED | OUTPATIENT
Start: 2017-03-25 | End: 2017-03-27

## 2017-03-25 RX ORDER — LEVOFLOXACIN 5 MG/ML
750 INJECTION, SOLUTION INTRAVENOUS ONCE
Status: COMPLETED | OUTPATIENT
Start: 2017-03-25 | End: 2017-03-25

## 2017-03-25 RX ORDER — LEVOFLOXACIN 5 MG/ML
750 INJECTION, SOLUTION INTRAVENOUS
Status: DISCONTINUED | OUTPATIENT
Start: 2017-03-27 | End: 2017-03-27

## 2017-03-25 RX ORDER — DEXTROSE AND SODIUM CHLORIDE 5; .45 G/100ML; G/100ML
INJECTION, SOLUTION INTRAVENOUS CONTINUOUS
Status: ACTIVE | OUTPATIENT
Start: 2017-03-25 | End: 2017-03-25

## 2017-03-25 RX ORDER — TRAZODONE HYDROCHLORIDE 50 MG/1
100 TABLET ORAL NIGHTLY
Status: DISCONTINUED | OUTPATIENT
Start: 2017-03-25 | End: 2017-03-27

## 2017-03-25 RX ORDER — DILTIAZEM HYDROCHLORIDE 180 MG/1
180 CAPSULE, COATED, EXTENDED RELEASE ORAL DAILY
COMMUNITY
End: 2017-09-15

## 2017-03-25 RX ORDER — FUROSEMIDE 20 MG/1
20 TABLET ORAL SEE ADMIN INSTRUCTIONS
Status: ON HOLD | COMMUNITY
End: 2017-04-21

## 2017-03-25 RX ORDER — FUROSEMIDE 40 MG/1
40 TABLET ORAL
Status: DISCONTINUED | OUTPATIENT
Start: 2017-03-27 | End: 2017-03-27

## 2017-03-25 RX ORDER — TRAZODONE HYDROCHLORIDE 50 MG/1
100 TABLET ORAL NIGHTLY
COMMUNITY
End: 2017-09-15

## 2017-03-25 RX ORDER — FUROSEMIDE 20 MG/1
40 TABLET ORAL
Status: ON HOLD | COMMUNITY
End: 2017-04-21

## 2017-03-25 RX ORDER — HYDROMORPHONE HYDROCHLORIDE 1 MG/ML
0.5 INJECTION, SOLUTION INTRAMUSCULAR; INTRAVENOUS; SUBCUTANEOUS EVERY 30 MIN PRN
Status: ACTIVE | OUTPATIENT
Start: 2017-03-25 | End: 2017-03-25

## 2017-03-25 RX ORDER — PAROXETINE 10 MG/1
10 TABLET, FILM COATED ORAL NIGHTLY
COMMUNITY
End: 2017-05-10 | Stop reason: SDUPTHER

## 2017-03-25 RX ORDER — ONDANSETRON 2 MG/ML
4 INJECTION INTRAMUSCULAR; INTRAVENOUS EVERY 4 HOURS PRN
Status: DISCONTINUED | OUTPATIENT
Start: 2017-03-25 | End: 2017-03-27

## 2017-03-25 RX ORDER — FUROSEMIDE 20 MG/1
20 TABLET ORAL
Status: DISCONTINUED | OUTPATIENT
Start: 2017-03-26 | End: 2017-03-27

## 2017-03-25 RX ORDER — METRONIDAZOLE 500 MG/100ML
500 INJECTION, SOLUTION INTRAVENOUS ONCE
Status: COMPLETED | OUTPATIENT
Start: 2017-03-25 | End: 2017-03-25

## 2017-03-25 RX ORDER — MORPHINE SULFATE 2 MG/ML
1 INJECTION, SOLUTION INTRAMUSCULAR; INTRAVENOUS EVERY 2 HOUR PRN
Status: DISCONTINUED | OUTPATIENT
Start: 2017-03-25 | End: 2017-03-27

## 2017-03-25 RX ORDER — MORPHINE SULFATE 4 MG/ML
4 INJECTION, SOLUTION INTRAMUSCULAR; INTRAVENOUS EVERY 2 HOUR PRN
Status: DISCONTINUED | OUTPATIENT
Start: 2017-03-25 | End: 2017-03-27

## 2017-03-25 NOTE — ED PROVIDER NOTES
Patient Seen in: [facility] Emergency Department    History   Patient presents with:  Abdomen/Flank Pain (GI/)    Stated Complaint: abd pain    HPI    Patient presents with episodic abdominal pain which has been occurring intermittently over the p Capsule SR 24 Hr,  TAKE ONE CAPSULE BY MOUTH EVERY DAY. PAROXETINE HCL 10 MG Oral Tab,  TAKE 1 TABLET BY MOUTH NIGHTLY AT BEDTIME   Warfarin Sodium 4 MG Oral Tab,  Take 4 mg by mouth 3 (three) times a week.  Monday, Wednesday, Friday   Warfarin Sodium 6 M motion. Neck supple. Cardiovascular: Normal rate, regular rhythm, normal heart sounds and intact distal pulses. No murmur heard. Pulmonary/Chest: Effort normal and breath sounds normal. No respiratory distress. Abdominal: Soft.  Bowel sounds are nor of pyuria. More significantly, the patient's abdominal CT revealed an area of acute diverticulitis with a small area of low-attenuation which may be acute inflammation or a possible small loculated abscess.   MDM     Patient presents with abdominal pain an

## 2017-03-26 PROCEDURE — 85610 PROTHROMBIN TIME: CPT | Performed by: HOSPITALIST

## 2017-03-26 PROCEDURE — 84132 ASSAY OF SERUM POTASSIUM: CPT | Performed by: HOSPITALIST

## 2017-03-26 PROCEDURE — 94664 DEMO&/EVAL PT USE INHALER: CPT

## 2017-03-26 PROCEDURE — 83735 ASSAY OF MAGNESIUM: CPT | Performed by: HOSPITALIST

## 2017-03-26 PROCEDURE — 80048 BASIC METABOLIC PNL TOTAL CA: CPT | Performed by: HOSPITALIST

## 2017-03-26 RX ORDER — POTASSIUM CHLORIDE 20 MEQ/1
40 TABLET, EXTENDED RELEASE ORAL EVERY 4 HOURS
Status: COMPLETED | OUTPATIENT
Start: 2017-03-26 | End: 2017-03-26

## 2017-03-26 NOTE — PROGRESS NOTES
[facility] Hospitalist Progress Note     PCP: Shala Quezada MD    CC:  Follow up    SUBJECTIVE:  Pain to lower abdomen same but improved with pain med. No n/v/f/c.  No BM    OBJECTIVE:  Temp:  [97.7 °F (36.5 °C)-98.4 °F (36.9 °C)] 98.4 °F (36.9 °C)  Pulse:  [53 PARoxetine HCl  10 mg Oral Nightly   • TraZODone HCl  100 mg Oral Nightly        ondansetron HCl, acetaminophen, morphINE sulfate **OR** morphINE sulfate **OR** morphINE sulfate       Assessment/Plan:       81 yo with HTN/HL, afib on coumadin, moderate MR,

## 2017-03-26 NOTE — CONSULTS
[address]    PATIENT'S NAME: Oren Johnson   ATTENDING PHYSICIAN: Risa Blum. Claudell Public, MD   CONSULTING PHYSICIAN: Lex Dang M.D.    PATIENT ACCOUNT#:   [de-identified]    LOCATION:  [facility]  MEDICAL RECORD #:   [id number]       DATE OF LYUBOV

## 2017-03-26 NOTE — PHYSICAL THERAPY NOTE
Order received for PT eula.  Pt declined this morning. She states she resides in an (I) living facility and uses a RW. Will check back as schedule allows.

## 2017-03-26 NOTE — H&P
[facility] Hospitalist H&P       CC: abdominal pain    PCP: Bailey Garcia MD    History of Present Illness:   Pt is a 79 yo with HTN/HL, afib on coumadin, moderate MR, osteoporosis who is admitted for abdominal pain.  For the past few days has been having Sunday Disp:  Rfl:    Warfarin Sodium 4 MG Oral Tab Take 4 mg by mouth daily. Disp:  Rfl:    Multiple Vitamins-Minerals (PRESERVISION AREDS) Oral Tab Take 1 tablet by mouth 2 (two) times daily.  Disp:  Rfl:    Olmesartan Medoxomil (BENICAR) 40 MG Oral Tab CBC/Chem  Recent Labs   Lab  03/25/17   1421   WBC  9.2   HGB  12.8   MCV  88.8   PLT  122.0*       Recent Labs   Lab  03/25/17   1421   NA  137   K  3.8   CL  100*   CO2  30.0   BUN  15   CREATSERUM  0.80   GLU  95   CA  8.8       Recent Labs   Lab  03/ WALL:  Normal.  No mass or hernia. URINARY BLADDER:  Normal.  No visible focal wall thickening, lesion, or calculus. PELVIC NODES:  Normal.  No adenopathy. PELVIC ORGANS:  Normal.  No visible mass. Pelvic organs appropriate for patient age.   BONES:  No pain.       Abdominal pain secondary to acute diverticulitis with possible small abscess seen on CT a/p  -IV abx for now  -pain control, antiemetics  -general surgery consult to assess possibility of abscess    **incidental finding of small b/l pleural eff

## 2017-03-26 NOTE — CONSULTS
[facility] Dosing Service  Warfarin (Coumadin) Initial Dosing    Mega Crooks is a 80year old female for whom pharmacy has been consulted to dose warfarin (COUMADIN) for Prophylaxis of venous thrombosis by Dr. Alex Hu.   Based on this indication, Prema Barry

## 2017-03-26 NOTE — PROGRESS NOTES
Pharmacy Dosing Service: Warfarin (Coumadin)    Juanita Aquino is a 80year old female for whom pharmacy has been consulted to dose warfarin (COUMADIN) for Prophylaxis of venous thrombosis by Dr. Ronni Vidal. Based on this indication, goal INR is 2-3.     Bethany Martinez

## 2017-03-26 NOTE — PROGRESS NOTES
Brief Internal Medicine Note    Full Note to Follow      Pt is a 79 yo with HTN/HL, afib on coumadin, moderate MR, osteoporosis who is admitted for abdominal pain.   For the past few days has been having intermittent abdominal pain of lower quadrants with

## 2017-03-27 VITALS
TEMPERATURE: 98 F | OXYGEN SATURATION: 96 % | DIASTOLIC BLOOD PRESSURE: 93 MMHG | SYSTOLIC BLOOD PRESSURE: 140 MMHG | HEART RATE: 90 BPM | RESPIRATION RATE: 16 BRPM | BODY MASS INDEX: 25.83 KG/M2 | WEIGHT: 155 LBS | HEIGHT: 65 IN

## 2017-03-27 PROCEDURE — 85610 PROTHROMBIN TIME: CPT | Performed by: HOSPITALIST

## 2017-03-27 PROCEDURE — 97162 PT EVAL MOD COMPLEX 30 MIN: CPT

## 2017-03-27 PROCEDURE — 97530 THERAPEUTIC ACTIVITIES: CPT

## 2017-03-27 RX ORDER — METRONIDAZOLE 500 MG/1
500 TABLET ORAL 3 TIMES DAILY
Qty: 21 TABLET | Refills: 0 | Status: SHIPPED | OUTPATIENT
Start: 2017-03-27 | End: 2017-04-03

## 2017-03-27 RX ORDER — LEVOFLOXACIN 750 MG/1
750 TABLET ORAL
Qty: 3 TABLET | Refills: 0 | Status: SHIPPED | OUTPATIENT
Start: 2017-03-27 | End: 2017-04-03

## 2017-03-27 RX ORDER — ACETAMINOPHEN 325 MG/1
650 TABLET ORAL EVERY 6 HOURS PRN
Qty: 30 TABLET | Refills: 0 | Status: SHIPPED | OUTPATIENT
Start: 2017-03-27 | End: 2017-05-24

## 2017-03-27 NOTE — PLAN OF CARE
GASTROINTESTINAL - ADULT    • Minimal or absence of nausea and vomiting Progressing    • Maintains or returns to baseline bowel function Progressing        Impaired Functional Mobility    • Achieve highest/safest level of mobility/gait Progressing        P

## 2017-03-27 NOTE — PROGRESS NOTES
[facility]  Progress Note    [place] Patient Status:  Inpatient    1924 MRN [id number]   [facility] 5NW-A Attending Terri Bird, *   Hosp Day # 2 PCP Stephany Rivera MD     Subjective:    Patient reports her katie Jarod Nunn, Na[place] [address]  General Surgery  3/27/2017

## 2017-03-27 NOTE — HOME CARE LIAISON
Received referral for Residential Home Health. [facility] will provide RN and PT for this patient. Referral sent to [facility] via [address].   Thank you for this referral.

## 2017-03-27 NOTE — PROGRESS NOTES
NURSING DISCHARGE NOTE    Discharged Home via Wheelchair. Accompanied by Support staff  Belongings Taken by patient/family. [facility], IV REMOVED. OK FOR DC PER HOSPITALIST AND SURGERY.  DC INSTRUCTIONS/MED LIST/SCRIPTS SENT WITH MARINO

## 2017-03-27 NOTE — PROGRESS NOTES
[facility] Hospitalist Progress Note     PCP: Gordon Guerra MD    CC:  Follow up    SUBJECTIVE:  Pain to lower abdomen same --not currently taking pain meds. Is 5/10.  No n/v/f/c.     OBJECTIVE:  Temp:  [98 °F (36.7 °C)-98.5 °F (36.9 °C)] 98 °F (36.7 °C)  Pul Potassium  100 mg Oral Daily   • PARoxetine HCl  10 mg Oral Nightly   • TraZODone HCl  100 mg Oral Nightly        ondansetron HCl, acetaminophen, morphINE sulfate **OR** morphINE sulfate **OR** morphINE sulfate       Assessment/Plan:       79 yo with HTN/H

## 2017-03-27 NOTE — CM/SW NOTE
03/27/17 1400   CM/SW Referral Data   Referral Source Physician   Reason for Referral Discharge planning;Psychoscial assessment   Informant Patient; Children   Pertinent Medical Hx   Primary Care Physician Name [facility]   Patient Info   Patient's Mental

## 2017-03-27 NOTE — PHYSICAL THERAPY NOTE
PHYSICAL THERAPY EVALUATION - INPATIENT     Room Number: 302/671-T  Evaluation Date: 3/27/2017  Type of Evaluation: Initial  Physician Order: PT Eval and Treat    Presenting Problem: abdominal pain  Reason for Therapy: Mobility Dysfunction and Discharg COGNITION  · Attention Span:  attends with cues to redirect  · Following Commands:  follows one step commands with increased time  · Safety Judgement:  decreased awareness of need for safety  · Problem Solving:  assistance required to identify errors m Ambulation 10ftx1;  50ftx1 with use of RW supervision. Noted at one point to be kicking L side of walker with LLE, self corrected. Pt's gait limited distance this date 2/2 incontinence of bowels.       Exercise/Education Provided:  Bed mobility  Body [facility]

## 2017-03-27 NOTE — PROGRESS NOTES
Pharmacy Dosing Service: Warfarin (Coumadin)  Sara Valadez is a 80year old female with a history of afib (on outpatient coumadin) for whom pharmacy has been dosing warfarin (Coumadin) per consult from Dr Davey Gould on 3/25/17.  Goal INR is 2-3    Recent L

## 2017-03-27 NOTE — DISCHARGE SUMMARY
General Medicine Discharge Summary     Patient ID:  Kamilah Jones  80year old  [id number]  1/17/1924    Admit date: 3/25/2017    Discharge date and time: 3/27/2017  2:53 PM     Attending Physician: Melissa Monae MD    Primary Care Physician: Patricia Drummond INR). Daily inrs    **osteoporosis-no acute issues, f/u with PCP    **hypokalemia-replete per protocol.  monitor    Consults: IP CONSULT TO HOSPITALIST  IP CONSULT TO SPIRITUAL CARE  IP CONSULT TO PHARMACY  IP CONSULT TO GENERAL SURGERY  IP CONSULT TO PHARM focal wall thickening, lesion, or calculus. PELVIC NODES:  Normal.  No adenopathy. PELVIC ORGANS:  Normal.  No visible mass. Pelvic organs appropriate for patient age. BONES:  Normal.  No bony lesion or fracture.   LUNG BASES:  Small right and tiny left 2 tablets (650 mg total) by mouth every 6 (six) hours as needed., Script printed, Disp-30 tablet, R-0    metRONIDAZOLE 500 MG Oral Tab  Take 1 tablet (500 mg total) by mouth 3 (three) times daily. , Script printed, Disp-21 tablet, R-0    levofloxacin 750 MG an appointment as soon as possible for a visit in 1 week.      Specialty: Internal Medicine     Contact information:     Nikita Little   [phone]             Please follow up.     Why: PLEASE HAVE YOUR INRs CHECKED WITH YO

## 2017-03-27 NOTE — PROGRESS NOTES
03/27/17 0846   Confucianism Encounters   Spiritual Requests During Visit / Hospitalization (Referral made to [facility] for [employer] as requsted by the patient.)   [address] [facility] [address] [facility]

## 2017-03-28 NOTE — CM/SW NOTE
Patient discharged on 03/27/2017 as previously planned.          03/28/17 0900   Discharge disposition   Discharged to: Home-Health   Name of Facillity/Home Care/Hospice Residential   Discharge transportation Private car

## 2017-03-30 ENCOUNTER — NURSE ONLY (OUTPATIENT)
Dept: LAB | Age: 82
End: 2017-03-30
Attending: INTERNAL MEDICINE
Payer: MEDICARE

## 2017-03-30 DIAGNOSIS — I50.9 CHF (CONGESTIVE HEART FAILURE) (HCC): Primary | ICD-10-CM

## 2017-03-30 PROCEDURE — 36415 COLL VENOUS BLD VENIPUNCTURE: CPT

## 2017-03-30 PROCEDURE — 85610 PROTHROMBIN TIME: CPT

## 2017-04-05 ENCOUNTER — HOSPITAL ENCOUNTER (OUTPATIENT)
Dept: CT IMAGING | Facility: HOSPITAL | Age: 82
Discharge: HOME | End: 2017-04-05
Attending: PHYSICIAN ASSISTANT
Payer: MEDICARE

## 2017-04-05 DIAGNOSIS — K57.92 ACUTE DIVERTICULITIS: ICD-10-CM

## 2017-04-05 PROCEDURE — 74177 CT ABD & PELVIS W/CONTRAST: CPT

## 2017-04-06 ENCOUNTER — LAB REQUISITION (OUTPATIENT)
Dept: LAB | Facility: HOSPITAL | Age: 82
End: 2017-04-06
Attending: INTERNAL MEDICINE
Payer: MEDICARE

## 2017-04-06 DIAGNOSIS — Z79.01 LONG TERM CURRENT USE OF ANTICOAGULANT: ICD-10-CM

## 2017-04-06 DIAGNOSIS — Z51.81 ENCOUNTER FOR THERAPEUTIC DRUG LEVEL MONITORING: ICD-10-CM

## 2017-04-06 DIAGNOSIS — I48.91 ATRIAL FIBRILLATION (HCC): ICD-10-CM

## 2017-04-06 PROCEDURE — 85610 PROTHROMBIN TIME: CPT | Performed by: INTERNAL MEDICINE

## 2017-04-16 ENCOUNTER — APPOINTMENT (OUTPATIENT)
Dept: GENERAL RADIOLOGY | Facility: HOSPITAL | Age: 82
DRG: 292 | End: 2017-04-16
Attending: EMERGENCY MEDICINE
Payer: MEDICARE

## 2017-04-16 ENCOUNTER — HOSPITAL ENCOUNTER (INPATIENT)
Facility: HOSPITAL | Age: 82
LOS: 4 days | Discharge: HOME HEALTH | DRG: 292 | End: 2017-04-21
Attending: EMERGENCY MEDICINE | Admitting: HOSPITALIST
Payer: MEDICARE

## 2017-04-16 DIAGNOSIS — I50.33 ACUTE ON CHRONIC DIASTOLIC CONGESTIVE HEART FAILURE (HCC): Primary | ICD-10-CM

## 2017-04-16 PROBLEM — R73.9 HYPERGLYCEMIA: Status: ACTIVE | Noted: 2017-04-16

## 2017-04-16 PROCEDURE — 99285 EMERGENCY DEPT VISIT HI MDM: CPT

## 2017-04-16 PROCEDURE — 85025 COMPLETE CBC W/AUTO DIFF WBC: CPT | Performed by: EMERGENCY MEDICINE

## 2017-04-16 PROCEDURE — 96374 THER/PROPH/DIAG INJ IV PUSH: CPT

## 2017-04-16 PROCEDURE — 84484 ASSAY OF TROPONIN QUANT: CPT | Performed by: EMERGENCY MEDICINE

## 2017-04-16 PROCEDURE — 83880 ASSAY OF NATRIURETIC PEPTIDE: CPT | Performed by: EMERGENCY MEDICINE

## 2017-04-16 PROCEDURE — 85730 THROMBOPLASTIN TIME PARTIAL: CPT | Performed by: EMERGENCY MEDICINE

## 2017-04-16 PROCEDURE — 83735 ASSAY OF MAGNESIUM: CPT | Performed by: EMERGENCY MEDICINE

## 2017-04-16 PROCEDURE — 85610 PROTHROMBIN TIME: CPT | Performed by: EMERGENCY MEDICINE

## 2017-04-16 PROCEDURE — 71010 XR CHEST AP PORTABLE  (CPT=71010): CPT

## 2017-04-16 PROCEDURE — 93010 ELECTROCARDIOGRAM REPORT: CPT

## 2017-04-16 PROCEDURE — 93005 ELECTROCARDIOGRAM TRACING: CPT

## 2017-04-16 PROCEDURE — 80053 COMPREHEN METABOLIC PANEL: CPT | Performed by: EMERGENCY MEDICINE

## 2017-04-16 RX ORDER — TRAZODONE HYDROCHLORIDE 50 MG/1
100 TABLET ORAL NIGHTLY
Status: DISCONTINUED | OUTPATIENT
Start: 2017-04-16 | End: 2017-04-21

## 2017-04-16 RX ORDER — DOCUSATE SODIUM 100 MG/1
100 CAPSULE, LIQUID FILLED ORAL 2 TIMES DAILY
Status: DISCONTINUED | OUTPATIENT
Start: 2017-04-16 | End: 2017-04-21

## 2017-04-16 RX ORDER — DILTIAZEM HYDROCHLORIDE 180 MG/1
180 CAPSULE, EXTENDED RELEASE ORAL DAILY
Status: DISCONTINUED | OUTPATIENT
Start: 2017-04-17 | End: 2017-04-16

## 2017-04-16 RX ORDER — WARFARIN SODIUM 2 MG/1
4 TABLET ORAL
Status: COMPLETED | OUTPATIENT
Start: 2017-04-16 | End: 2017-04-16

## 2017-04-16 RX ORDER — PAROXETINE 10 MG/1
10 TABLET, FILM COATED ORAL NIGHTLY
Status: DISCONTINUED | OUTPATIENT
Start: 2017-04-16 | End: 2017-04-21

## 2017-04-16 RX ORDER — BISACODYL 10 MG
10 SUPPOSITORY, RECTAL RECTAL
Status: DISCONTINUED | OUTPATIENT
Start: 2017-04-16 | End: 2017-04-21

## 2017-04-16 RX ORDER — ACETAMINOPHEN 325 MG/1
650 TABLET ORAL EVERY 6 HOURS PRN
Status: DISCONTINUED | OUTPATIENT
Start: 2017-04-16 | End: 2017-04-21

## 2017-04-16 RX ORDER — SENNOSIDES 8.6 MG
8.6 TABLET ORAL 2 TIMES DAILY PRN
Status: DISCONTINUED | OUTPATIENT
Start: 2017-04-16 | End: 2017-04-21

## 2017-04-16 RX ORDER — POTASSIUM CHLORIDE 20 MEQ/1
40 TABLET, EXTENDED RELEASE ORAL EVERY 4 HOURS
Status: DISPENSED | OUTPATIENT
Start: 2017-04-16 | End: 2017-04-17

## 2017-04-16 RX ORDER — SENNOSIDES 8.6 MG
8.6 TABLET ORAL 2 TIMES DAILY
Status: DISCONTINUED | OUTPATIENT
Start: 2017-04-16 | End: 2017-04-16

## 2017-04-16 RX ORDER — LOSARTAN POTASSIUM 100 MG/1
100 TABLET ORAL DAILY
Status: DISCONTINUED | OUTPATIENT
Start: 2017-04-16 | End: 2017-04-21

## 2017-04-16 RX ORDER — ONDANSETRON 2 MG/ML
4 INJECTION INTRAMUSCULAR; INTRAVENOUS EVERY 6 HOURS PRN
Status: DISCONTINUED | OUTPATIENT
Start: 2017-04-16 | End: 2017-04-21

## 2017-04-16 RX ORDER — DILTIAZEM HYDROCHLORIDE 180 MG/1
180 CAPSULE, EXTENDED RELEASE ORAL DAILY
Status: DISCONTINUED | OUTPATIENT
Start: 2017-04-16 | End: 2017-04-21

## 2017-04-16 RX ORDER — FUROSEMIDE 10 MG/ML
40 INJECTION INTRAMUSCULAR; INTRAVENOUS
Status: DISCONTINUED | OUTPATIENT
Start: 2017-04-16 | End: 2017-04-17

## 2017-04-16 RX ORDER — LOSARTAN POTASSIUM 100 MG/1
100 TABLET ORAL DAILY
Status: DISCONTINUED | OUTPATIENT
Start: 2017-04-17 | End: 2017-04-16

## 2017-04-16 RX ORDER — FUROSEMIDE 10 MG/ML
40 INJECTION INTRAMUSCULAR; INTRAVENOUS ONCE
Status: COMPLETED | OUTPATIENT
Start: 2017-04-16 | End: 2017-04-16

## 2017-04-16 NOTE — ED PROVIDER NOTES
Patient Seen in: [facility] Emergency Department    History   Patient presents with:  Dyspnea NORMAN SOB (respiratory)    Stated Complaint: D. I.B    HPI    Patient presents with shortness of breath.   The patient thinks that her symptoms started about a four days a week    Multiple Vitamins-Minerals (PRESERVISION AREDS) Oral Tab,  Take 1 tablet by mouth 2 (two) times daily. Olmesartan Medoxomil (BENICAR) 40 MG Oral Tab,  Take 40 mg by mouth daily.        Family History   Problem Relation Age of Onset   • for the following:     PT 28.0 (*)     INR 2.56 (*)     All other components within normal limits   COMP METABOLIC PANEL (14) - Abnormal; Notable for the following:     Glucose 112 (*)     Albumin 3.3 (*)     All other components within normal limits   PRO silhouette is stable. Pulmonary vasculature demonstrates apical distribution. Interstitial abnormalities throughout the lungs are stable. Small bilateral lower lung field consolidation and pleural effusions are again identified. No pneumothorax.       4

## 2017-04-16 NOTE — ED INITIAL ASSESSMENT (HPI)
Pt to ED via EMS for c/o NORMAN that started to get worse today. Denies CP. Labored breathing noted. Asked pt if she has been taking her Lasix, pt sts \"I'm not sure. \"

## 2017-04-16 NOTE — H&P
[facility] Hospitalist History and Physical      Patient presents with:  Dyspnea NORMAN SOB (respiratory)       PCP: Nicholas Pappas MD      History of Present Illness: Patient is a 80year old female with PMH sig for HTN, Atrial fibrillation on coumadin, HL, moderat 10 mg by mouth nightly. Disp:  Rfl:    TraZODone HCl 50 MG Oral Tab Take 100 mg by mouth nightly. Disp:  Rfl:    furosemide 20 MG Oral Tab Take 40 mg by mouth 3 (three) times a week.  40mg MWF Disp:  Rfl:    furosemide 20 MG Oral Tab Take 20 mg by mouth See Bowel sounds normal. No masses,  No organomegaly. Non distended   Extremities: +edema   Skin: Skin color, texture, turgor normal. No rashes or lesions.     Neurologic: Normal strength, no focal deficit appreciated     Data Review:    LABS:     Lab Results Spleen, pancreas, adrenal glands, liver appear stable. There are atherosclerotic changes including calcification involving the abdominal aorta, but no evidence for aortic aneurysm or iliac artery aneurysm. No evidence for retroperitoneal adenopathy.  Redemo and surrounding inflammation involving the sigmoid colon consistent with acute diverticulitis. No free air is seen. Standard in the region of inflammation is a low attenuation peripherally enhancing region measuring approximately 2.2 x 1.5 cm.  This may rep Corina Camacho MD            Xr Chest Ap Portable  (cpt=71010)    3/25/2017  PROCEDURE:  XR CHEST AP PORTABLE (CPT=71010)  TECHNIQUE:  AP chest radiograph was obtained. COMPARISON:  DAYA XR CHEST AP PORTABLE  (CPT=71010), 1/13/2017, 15:23.   INDICATIONS:

## 2017-04-16 NOTE — CONSULTS
[facility] Cardiology Consultation    Marilu Leslie Patient Status:  Observation    1924 MRN [id number]   [facility] 8NE-A Attending Flaca Rod MD   Hosp Day # 0 PCP Elicia Bamberger, MD     Reason for Consultation:  CHF      History of Infusions:       Social History:   reports that she has never smoked. She has never used smokeless tobacco. She reports that she does not drink alcohol or use illicit drugs.     Review of Systems:  All systems were reviewed and are negative except as descri stable.      Pulmonary vasculature demonstrates apical distribution.      Interstitial abnormalities throughout the lungs are stable.      Small bilateral lower lung field consolidation and pleural effusions are again identified.      No pneumothorax.

## 2017-04-16 NOTE — CONSULTS
[facility] Dosing Service  Warfarin (Coumadin) Initial Dosing    Kadi Collado is a 80year old female for whom pharmacy has been consulted to dose warfarin (COUMADIN) for Afib by Chris Wilson 42. Based on this indication, goal INR is 2-3.       Pe

## 2017-04-17 ENCOUNTER — APPOINTMENT (OUTPATIENT)
Dept: CV DIAGNOSTICS | Facility: HOSPITAL | Age: 82
DRG: 292 | End: 2017-04-17
Attending: INTERNAL MEDICINE
Payer: MEDICARE

## 2017-04-17 PROCEDURE — 93306 TTE W/DOPPLER COMPLETE: CPT | Performed by: INTERNAL MEDICINE

## 2017-04-17 PROCEDURE — 80048 BASIC METABOLIC PNL TOTAL CA: CPT | Performed by: HOSPITALIST

## 2017-04-17 PROCEDURE — 84132 ASSAY OF SERUM POTASSIUM: CPT | Performed by: INTERNAL MEDICINE

## 2017-04-17 PROCEDURE — 85610 PROTHROMBIN TIME: CPT | Performed by: HOSPITALIST

## 2017-04-17 PROCEDURE — 93306 TTE W/DOPPLER COMPLETE: CPT

## 2017-04-17 PROCEDURE — 36415 COLL VENOUS BLD VENIPUNCTURE: CPT

## 2017-04-17 PROCEDURE — 85025 COMPLETE CBC W/AUTO DIFF WBC: CPT | Performed by: HOSPITALIST

## 2017-04-17 RX ORDER — WARFARIN SODIUM 2 MG/1
4 TABLET ORAL
Status: COMPLETED | OUTPATIENT
Start: 2017-04-17 | End: 2017-04-17

## 2017-04-17 RX ORDER — FUROSEMIDE 10 MG/ML
60 INJECTION INTRAMUSCULAR; INTRAVENOUS
Status: DISCONTINUED | OUTPATIENT
Start: 2017-04-17 | End: 2017-04-20

## 2017-04-17 NOTE — CARDIAC REHAB
Cardiac rehab received consult for HF education. Education completed with patient, watching videos too.

## 2017-04-17 NOTE — PLAN OF CARE
CARDIOVASCULAR - ADULT    • Maintains optimal cardiac output and hemodynamic stability Progressing    • Absence of cardiac arrhythmias or at baseline Progressing    Controlled a fib with PVC's on telemetry. VSS. K+ replaced per protocol.   Will recheck le

## 2017-04-17 NOTE — CM/SW NOTE
SW met with patient to assess for discharge needs. Patient was recently discharged from [facility] on 3/27. Patient was discharged home with [facility]. Patient would not like to use [facility] upon discharge this admission.   Patient living @ [address] [facility] [place]   Did you have a follow-up appointment scheduled at time of discharge? Yes   ----F/U appt: Did you go to that appointment? Yes   ---- F/U appt: How many days after discharge was follow-up appointment?  0-7 days   Was full assess

## 2017-04-17 NOTE — PROGRESS NOTES
[facility] [address] [place] [address] Cardiology Progress Note        Marilu Leslie Patient Status:  Observation    1924 MRN [id number]   [facility] 8NE-A Attending Flaca Rod, [facility] Se Day # 1 PCP Elicia Bamberger, MD     Subjective: Warm and dry.            LABS:      HEM:  Recent Labs   Lab  04/16/17   0844  04/17/17   0505   WBC  6.6  6.5   HGB  12.9  11.7*   HCT  38.1  35.4   PLT  103.0*  114.0*       Chem:  Recent Labs   Lab  04/16/17   0844  04/17/17   0505   NA  138  140   K  3.6  This study is compared with previous dated 08/15/2013:  Compared to the prior study, there has been no significant interval change. *      Impression:          1. Acute on Chronic diastolic CHF- lungs still sound bad    2.  Low energy, weakness, fatigue

## 2017-04-17 NOTE — CONSULTS
[facility] Dosing Service  Warfarin (Coumadin) Subsequent Dosing    Marichuy Cheney is a 80year old female for whom pharmacy has been dosing warfarin (Coumadin) for chronic A-fib.  Goal INR is 2-3    Recent Labs   Lab  04/16/17   0844  04/17/17   1757

## 2017-04-17 NOTE — CERTIFICATION
**Certification    PHYSICIAN Certification of Need for Inpatient Hospitalization    Based on the her current state of illness, Lauro requires inpatient hospitalization for her dyspnea.   This requires inpatient medical treatment because the patient

## 2017-04-17 NOTE — PROGRESS NOTES
[facility] Hospitalist Progress Note                                                                   [facility]  1/17/1924    CC: F/U dyspnea, acute on chronic diastolic CHF    SUBJECTIVE: HTN, Atrial fibrillation on coumadin, HL, moderate MR, diastolic CHF who presented to ED with complaints of shortness of breath.     Dyspnea/hypoxia secondary to acute on chronic diastolic heart failure  -CXR personally reviewed, interstitial abnormalities

## 2017-04-18 PROCEDURE — 85610 PROTHROMBIN TIME: CPT | Performed by: HOSPITALIST

## 2017-04-18 PROCEDURE — 80048 BASIC METABOLIC PNL TOTAL CA: CPT | Performed by: INTERNAL MEDICINE

## 2017-04-18 PROCEDURE — 84132 ASSAY OF SERUM POTASSIUM: CPT | Performed by: INTERNAL MEDICINE

## 2017-04-18 RX ORDER — POTASSIUM CHLORIDE 20 MEQ/1
40 TABLET, EXTENDED RELEASE ORAL EVERY 4 HOURS
Status: COMPLETED | OUTPATIENT
Start: 2017-04-18 | End: 2017-04-18

## 2017-04-18 NOTE — HOME CARE LIAISON
Received referral for [facility]. Met with patient and had lengthy discussion re: [facility]'s [facility] program. Pt is now agreeable. Agency brochure provided to patient. Referral sent to [facility] via [address].  [facility] has accepted pt and will provide skilled nurse,

## 2017-04-18 NOTE — PROGRESS NOTES
[facility]  Cardiology Progress Note    Estella Ulloa Patient Status:  Inpatient    1924 MRN [id number]   [facility] 8NE-A Attending Perla Marcano,  [facility] Se Day # 2 PCP Kendal Green MD     Assessment:  Acute on chronic owen preserved  Psychiatric: Normal mood and affect  Skin: Warm and dry, no obvious rashes     MEDICATIONS:  • Warfarin Sodium  6 mg Oral Once at night   • furosemide  60 mg Intravenous BID (Diuretic)   • PARoxetine HCl  10 mg Oral Nightly   • TraZODone HCl  10

## 2017-04-18 NOTE — PROGRESS NOTES
120 [facility] Dosing Service  Warfarin (Coumadin) Subsequent Dosing    Art Ordaz is a 80year old female for whom pharmacy has been dosing warfarin (Coumadin).  Goal INR is 2-3    Recent Labs   Lab  04/16/17   0844  04/17/17   0505  04/18/17   0506

## 2017-04-18 NOTE — PROGRESS NOTES
[facility] Hospitalist Progress Note                                                                   [facility]  1/17/1924    CC: F/U dyspnea, acute on chronic diastolic CHF    SUBJECTIVE: Senna    Assessment/Plan:  Principal Problem:    Acute on chronic diastolic congestive heart failure (HCC)  Active Problems:    Hyperglycemia      80year old female with PMH sig for HTN, Atrial fibrillation on coumadin, HL, moderate MR, diastolic CHF who

## 2017-04-19 ENCOUNTER — APPOINTMENT (OUTPATIENT)
Dept: GENERAL RADIOLOGY | Facility: HOSPITAL | Age: 82
DRG: 292 | End: 2017-04-19
Attending: INTERNAL MEDICINE
Payer: MEDICARE

## 2017-04-19 PROCEDURE — 85610 PROTHROMBIN TIME: CPT | Performed by: HOSPITALIST

## 2017-04-19 PROCEDURE — 71020 XR CHEST PA + LAT CHEST (CPT=71020): CPT

## 2017-04-19 PROCEDURE — 85025 COMPLETE CBC W/AUTO DIFF WBC: CPT | Performed by: HOSPITALIST

## 2017-04-19 PROCEDURE — 80048 BASIC METABOLIC PNL TOTAL CA: CPT | Performed by: HOSPITALIST

## 2017-04-19 RX ORDER — WARFARIN SODIUM 2 MG/1
4 TABLET ORAL
Status: COMPLETED | OUTPATIENT
Start: 2017-04-19 | End: 2017-04-19

## 2017-04-19 RX ORDER — POTASSIUM CHLORIDE 20 MEQ/1
40 TABLET, EXTENDED RELEASE ORAL ONCE
Status: COMPLETED | OUTPATIENT
Start: 2017-04-19 | End: 2017-04-19

## 2017-04-19 NOTE — PROGRESS NOTES
Pharmacy Dosing Service: Warfarin (Coumadin)  Kandace Messina is a 80year old female with chronic afib (on outpatient coumadin) for whom pharmacy has been dosing warfarin (Coumadin) per consult to dose from Dr. Krystal Calles on 4/16/17.  Goal INR is 2-3    Rec

## 2017-04-19 NOTE — PROGRESS NOTES
[facility]  Cardiology Progress Note    Justin uRsso Patient Status:  Inpatient    1924 MRN [id number]   [facility] 8NE-A Attending Laina Dupree,  [facility] Se Day # 3 PCP Jose Begum MD     Assessment:  Acute on chronic owen Peripheral pulses are  2+, trace BLE edema.   Neurologic: Alert and oriented,  Gross motor and sensory modalities preserved  Psychiatric: Normal mood and affect  Skin: Warm and dry, no obvious rashes     MEDICATIONS:  • furosemide  60 mg Intravenous BID Harper Serrano

## 2017-04-19 NOTE — DIETARY NOTE
Nutrition Short Note   Dietitian consult received per heart failure standing order. Reviewed sodium and fluid guidelines. Pt receptive, lives in [place] housing, dinner provided. Pt verbalizes understanding and has no further questions at this time.  RD availabl

## 2017-04-19 NOTE — PROGRESS NOTES
[facility] Hospitalist Progress Note                                                                   [facility]  1/17/1924    CC: F/U dyspnea, acute on chronic diastolic CHF    SUBJECTIVE: • metoprolol Tartrate  25 mg Oral 2x Daily(Beta Blocker)   • docusate sodium  100 mg Oral BID     Continuous Infusions:    PRN: acetaminophen, ondansetron HCl, psyllium, bisacodyl, Senna    Assessment/Plan:  Principal Problem:    Acute on chronic diastol

## 2017-04-19 NOTE — PLAN OF CARE
I personally reviewed CXR image and compared to CXR on admit. Today's film shows definite clearing of bilateral pulm infiltrates. Right pleural eff slightly smaller. I would not pursue thoracentesis at this time. Continue IV diuretics.

## 2017-04-20 PROCEDURE — 80048 BASIC METABOLIC PNL TOTAL CA: CPT | Performed by: HOSPITALIST

## 2017-04-20 PROCEDURE — 85025 COMPLETE CBC W/AUTO DIFF WBC: CPT | Performed by: HOSPITALIST

## 2017-04-20 PROCEDURE — 85610 PROTHROMBIN TIME: CPT | Performed by: HOSPITALIST

## 2017-04-20 PROCEDURE — 84132 ASSAY OF SERUM POTASSIUM: CPT | Performed by: INTERNAL MEDICINE

## 2017-04-20 PROCEDURE — 84132 ASSAY OF SERUM POTASSIUM: CPT | Performed by: HOSPITALIST

## 2017-04-20 RX ORDER — WARFARIN SODIUM 2 MG/1
4 TABLET ORAL
Status: COMPLETED | OUTPATIENT
Start: 2017-04-20 | End: 2017-04-20

## 2017-04-20 RX ORDER — FUROSEMIDE 20 MG/1
20 TABLET ORAL
Status: DISCONTINUED | OUTPATIENT
Start: 2017-04-20 | End: 2017-04-21

## 2017-04-20 RX ORDER — POTASSIUM CHLORIDE 20 MEQ/1
20 TABLET, EXTENDED RELEASE ORAL DAILY
Status: DISCONTINUED | OUTPATIENT
Start: 2017-04-21 | End: 2017-04-21

## 2017-04-20 RX ORDER — POTASSIUM CHLORIDE 20 MEQ/1
40 TABLET, EXTENDED RELEASE ORAL EVERY 4 HOURS
Status: COMPLETED | OUTPATIENT
Start: 2017-04-20 | End: 2017-04-20

## 2017-04-20 NOTE — PROGRESS NOTES
[facility]  Cardiology Progress Note    Kadi Collado Patient Status:  Inpatient    1924 MRN [id number]   [facility] 8NE-A Attending Cindi Aviles, 0 [facility] Se Day # 4 PCP Kandace Kenyon MD     Assessment:  Acute on chronic owen hepatosplenomegaly, masses  Extremities:  Peripheral pulses are  2+, no edema.   Neurologic: Alert and oriented,  Gross motor and sensory modalities preserved  Psychiatric: Normal mood and affect  Skin: Warm and dry, no obvious rashes     MEDICATIONS:  • fu

## 2017-04-20 NOTE — PROGRESS NOTES
120 [facility] Dosing Service  Warfarin (Coumadin) Subsequent Dosing    Kamilah Jones is a 80year old female for whom pharmacy has been dosing warfarin (Coumadin).  Goal INR is 2-3    Recent Labs   Lab  04/18/17   0505  04/19/17   0502  04/20/17   0454

## 2017-04-20 NOTE — PROGRESS NOTES
[facility] Hospitalist Progress Note                                                                   [facility]  1/17/1924    CC: F/U dyspnea, acute on chronic diastolic CHF    SUBJECTIVE: sodium  100 mg Oral BID     Continuous Infusions:    PRN: acetaminophen, ondansetron HCl, psyllium, bisacodyl, Senna    Assessment/Plan:  Principal Problem:    Acute on chronic diastolic congestive heart failure (HCC)  Active Problems:    Hyperglycemia

## 2017-04-21 VITALS
BODY MASS INDEX: 24.94 KG/M2 | HEIGHT: 65 IN | SYSTOLIC BLOOD PRESSURE: 152 MMHG | RESPIRATION RATE: 20 BRPM | OXYGEN SATURATION: 98 % | HEART RATE: 65 BPM | DIASTOLIC BLOOD PRESSURE: 95 MMHG | WEIGHT: 149.69 LBS | TEMPERATURE: 98 F

## 2017-04-21 PROCEDURE — 80048 BASIC METABOLIC PNL TOTAL CA: CPT | Performed by: HOSPITALIST

## 2017-04-21 PROCEDURE — 85610 PROTHROMBIN TIME: CPT | Performed by: HOSPITALIST

## 2017-04-21 RX ORDER — PSEUDOEPHEDRINE HCL 30 MG
100 TABLET ORAL 2 TIMES DAILY PRN
Qty: 20 CAPSULE | Refills: 0 | Status: SHIPPED | OUTPATIENT
Start: 2017-04-21

## 2017-04-21 RX ORDER — FUROSEMIDE 20 MG/1
20 TABLET ORAL
Qty: 60 TABLET | Refills: 6 | Status: SHIPPED | OUTPATIENT
Start: 2017-04-21 | End: 2017-04-21

## 2017-04-21 RX ORDER — POTASSIUM CHLORIDE 20 MEQ/1
20 TABLET, EXTENDED RELEASE ORAL DAILY
Qty: 30 TABLET | Refills: 6 | Status: SHIPPED | OUTPATIENT
Start: 2017-04-21 | End: 2017-09-15

## 2017-04-21 RX ORDER — WARFARIN SODIUM 2 MG/1
4 TABLET ORAL
Status: DISCONTINUED | OUTPATIENT
Start: 2017-04-21 | End: 2017-04-21

## 2017-04-21 NOTE — PLAN OF CARE
Denies any difficulty breathing. 2L O2 with saturations in mid 90s. Non-pitting bilateral lower extremity edema present. Switched by cardiology to PO lasix today. Afib HR in 60s. Frequent PVCs. On coumadin.    Plan for possible d/c tomorrow with home he

## 2017-04-21 NOTE — CM/SW NOTE
QUITA order for [facility]. Pt has a h/o [facility] and is in agreement to have again, has been accepted. [facility]  P:[phone].

## 2017-04-21 NOTE — PROGRESS NOTES
[facility] hospitalist daily note  Patient was seen/examined on 4/21/17    S; no chest pain, no SOB at this time, no cough,  no nausea/emesis, no diarrhea,denies bleeding    Medications in EPIC    PE;   04/21/17  0818   BP: 152/95   Pulse: 65   Temp: 97.6 °F

## 2017-04-21 NOTE — PROGRESS NOTES
120 [facility] Dosing Service  Warfarin (Coumadin) Subsequent Dosing    Noe Powell is a 80year old female for whom pharmacy has been dosing warfarin (Coumadin).  Goal INR is 2-3    Recent Labs   Lab  04/19/17   0502  04/20/17   0457  04/21/17   3299

## 2017-04-21 NOTE — PROGRESS NOTES
[facility]  Cardiology Progress Note    [place] Patient Status:  Inpatient    1924 MRN [id number]   [facility] 8NE-A Attending Pilar Chavez MD   [address] Day # 5 PCP Stephany Rivera MD     Assessment:  Acute on chronic owen MEDICATIONS:  • Warfarin Sodium  4 mg Oral Once at night   • furosemide  20 mg Oral BID (Diuretic)   • Potassium Chloride ER  20 mEq Oral Daily   • PARoxetine HCl  10 mg Oral Nightly   • TraZODone HCl  100 mg Oral Nightly   • DilTIAZem HCl ER Coated Bead

## 2017-04-24 NOTE — DISCHARGE SUMMARY
[facility]  Discharge Summary    Otilia Mason Patient Status:  Inpatient    1924 MRN [id number]   [facility] 8NE-A Attending No att. providers found   Hosp Day # 5 PCP Irving Lutz MD     Date of Admission: 2017    Date CBC at the follow up    Preventative INR >2    Discharge when ok with cardiology    Total time spent on discharge >30 minutes      Consultations: cardiology    Procedures: see EPIC    Complications: see EPIC    Disposition: [address] [facility] RN check INR with result to you Coumadin clinic        Other Discharge Instructions:   Going Home    In this section you will find the tools which will guide you through the first few days after you leave the hospital. Continued use of these to more tired with normal activity  · You are more short of breath lying down, or awaken at night short of breath  · You have swelling of your feet or legs  · You urinate less often during the day and more often at night  · You have cramps in your legs  · You

## 2017-05-04 ENCOUNTER — LAB REQUISITION (OUTPATIENT)
Dept: LAB | Facility: HOSPITAL | Age: 82
End: 2017-05-04
Attending: INTERNAL MEDICINE
Payer: MEDICARE

## 2017-05-04 DIAGNOSIS — Z51.81 ENCOUNTER FOR THERAPEUTIC DRUG LEVEL MONITORING: ICD-10-CM

## 2017-05-04 DIAGNOSIS — I48.91 ATRIAL FIBRILLATION (HCC): ICD-10-CM

## 2017-05-04 DIAGNOSIS — Z79.01 LONG TERM CURRENT USE OF ANTICOAGULANT: ICD-10-CM

## 2017-05-04 PROCEDURE — 85610 PROTHROMBIN TIME: CPT | Performed by: INTERNAL MEDICINE

## 2017-05-18 ENCOUNTER — LAB REQUISITION (OUTPATIENT)
Dept: LAB | Facility: HOSPITAL | Age: 82
End: 2017-05-18
Attending: INTERNAL MEDICINE
Payer: MEDICARE

## 2017-05-18 DIAGNOSIS — Z79.01 LONG TERM CURRENT USE OF ANTICOAGULANT: ICD-10-CM

## 2017-05-18 DIAGNOSIS — I48.91 ATRIAL FIBRILLATION (HCC): ICD-10-CM

## 2017-05-18 PROCEDURE — 85610 PROTHROMBIN TIME: CPT | Performed by: INTERNAL MEDICINE

## 2017-05-24 PROBLEM — R53.83 FATIGUE: Status: ACTIVE | Noted: 2017-05-24

## 2017-06-01 ENCOUNTER — LAB REQUISITION (OUTPATIENT)
Dept: LAB | Facility: HOSPITAL | Age: 82
End: 2017-06-01
Attending: INTERNAL MEDICINE
Payer: MEDICARE

## 2017-06-01 DIAGNOSIS — I48.91 ATRIAL FIBRILLATION (HCC): ICD-10-CM

## 2017-06-01 DIAGNOSIS — Z79.01 LONG TERM CURRENT USE OF ANTICOAGULANT: ICD-10-CM

## 2017-06-01 PROCEDURE — 85610 PROTHROMBIN TIME: CPT | Performed by: INTERNAL MEDICINE

## 2017-07-20 ENCOUNTER — LAB REQUISITION (OUTPATIENT)
Dept: LAB | Facility: HOSPITAL | Age: 82
End: 2017-07-20
Payer: MEDICARE

## 2017-07-20 ENCOUNTER — LAB REQUISITION (OUTPATIENT)
Dept: LAB | Facility: HOSPITAL | Age: 82
End: 2017-07-20
Attending: INTERNAL MEDICINE
Payer: MEDICARE

## 2017-07-20 DIAGNOSIS — Z51.81 ENCOUNTER FOR THERAPEUTIC DRUG LEVEL MONITORING: ICD-10-CM

## 2017-07-20 DIAGNOSIS — Z79.01 LONG TERM CURRENT USE OF ANTICOAGULANT: ICD-10-CM

## 2017-07-20 DIAGNOSIS — I48.91 ATRIAL FIBRILLATION (HCC): ICD-10-CM

## 2017-07-20 DIAGNOSIS — I48.20 CHRONIC ATRIAL FIBRILLATION (HCC): ICD-10-CM

## 2017-07-20 LAB
POC ANTICOAG MACHINE PASS CONTROL: YES
POC INR: 1.5 (ref 0.8–1.3)

## 2017-07-20 PROCEDURE — 85610 PROTHROMBIN TIME: CPT | Performed by: INTERNAL MEDICINE

## 2017-08-03 ENCOUNTER — LAB REQUISITION (OUTPATIENT)
Dept: LAB | Facility: HOSPITAL | Age: 82
End: 2017-08-03
Attending: INTERNAL MEDICINE
Payer: MEDICARE

## 2017-08-03 DIAGNOSIS — Z79.01 LONG TERM CURRENT USE OF ANTICOAGULANT: ICD-10-CM

## 2017-08-03 LAB
POC ANTICOAG MACHINE PASS CONTROL: YES
POC INR: 1.3 (ref 0.8–1.3)

## 2017-08-03 PROCEDURE — 85610 PROTHROMBIN TIME: CPT | Performed by: INTERNAL MEDICINE

## 2017-08-17 ENCOUNTER — LAB REQUISITION (OUTPATIENT)
Dept: LAB | Facility: HOSPITAL | Age: 82
End: 2017-08-17
Attending: INTERNAL MEDICINE
Payer: MEDICARE

## 2017-08-17 DIAGNOSIS — Z51.81 ENCOUNTER FOR THERAPEUTIC DRUG LEVEL MONITORING: ICD-10-CM

## 2017-08-17 DIAGNOSIS — Z79.01 LONG TERM CURRENT USE OF ANTICOAGULANT: ICD-10-CM

## 2017-08-17 DIAGNOSIS — I48.91 ATRIAL FIBRILLATION (HCC): ICD-10-CM

## 2017-08-17 LAB
POC ANTICOAG MACHINE PASS CONTROL: YES
POC INR: 1.6 (ref 0.8–1.3)

## 2017-08-17 PROCEDURE — 85610 PROTHROMBIN TIME: CPT | Performed by: INTERNAL MEDICINE

## 2017-09-07 ENCOUNTER — LAB REQUISITION (OUTPATIENT)
Dept: LAB | Facility: HOSPITAL | Age: 82
End: 2017-09-07
Attending: INTERNAL MEDICINE
Payer: MEDICARE

## 2017-09-07 DIAGNOSIS — I48.91 ATRIAL FIBRILLATION (HCC): ICD-10-CM

## 2017-09-07 DIAGNOSIS — Z79.01 LONG TERM CURRENT USE OF ANTICOAGULANT: ICD-10-CM

## 2017-09-07 DIAGNOSIS — Z51.81 ENCOUNTER FOR THERAPEUTIC DRUG LEVEL MONITORING: ICD-10-CM

## 2017-09-07 PROCEDURE — 85610 PROTHROMBIN TIME: CPT | Performed by: INTERNAL MEDICINE

## 2017-09-08 LAB
POC ANTICOAG MACHINE PASS CONTROL: YES
POC INR: 2 (ref 0.8–1.3)

## 2017-09-15 PROBLEM — I50.33 ACUTE ON CHRONIC DIASTOLIC CONGESTIVE HEART FAILURE (HCC): Status: RESOLVED | Noted: 2017-04-16 | Resolved: 2017-09-15

## 2017-09-15 PROBLEM — I50.30 DIASTOLIC CHF (HCC): Status: ACTIVE | Noted: 2017-09-15

## 2017-09-21 ENCOUNTER — LAB REQUISITION (OUTPATIENT)
Dept: LAB | Facility: HOSPITAL | Age: 82
DRG: 292 | End: 2017-09-21
Attending: INTERNAL MEDICINE
Payer: MEDICARE

## 2017-09-21 DIAGNOSIS — I48.91 ATRIAL FIBRILLATION (HCC): ICD-10-CM

## 2017-09-21 DIAGNOSIS — Z51.81 ENCOUNTER FOR THERAPEUTIC DRUG LEVEL MONITORING: ICD-10-CM

## 2017-09-21 DIAGNOSIS — Z79.01 LONG TERM CURRENT USE OF ANTICOAGULANT: ICD-10-CM

## 2017-09-21 PROCEDURE — 85610 PROTHROMBIN TIME: CPT | Performed by: INTERNAL MEDICINE

## 2017-09-23 ENCOUNTER — APPOINTMENT (OUTPATIENT)
Dept: GENERAL RADIOLOGY | Facility: HOSPITAL | Age: 82
DRG: 292 | End: 2017-09-23
Payer: MEDICARE

## 2017-09-23 ENCOUNTER — HOSPITAL ENCOUNTER (INPATIENT)
Facility: HOSPITAL | Age: 82
LOS: 6 days | Discharge: NURSING, REHAB OR LONG-TERM CARE | DRG: 292 | End: 2017-09-29
Attending: EMERGENCY MEDICINE | Admitting: HOSPITALIST
Payer: MEDICARE

## 2017-09-23 DIAGNOSIS — I50.9 ACUTE ON CHRONIC CONGESTIVE HEART FAILURE, UNSPECIFIED CONGESTIVE HEART FAILURE TYPE: Primary | ICD-10-CM

## 2017-09-23 PROCEDURE — 94640 AIRWAY INHALATION TREATMENT: CPT

## 2017-09-23 PROCEDURE — 96365 THER/PROPH/DIAG IV INF INIT: CPT

## 2017-09-23 PROCEDURE — 99291 CRITICAL CARE FIRST HOUR: CPT

## 2017-09-23 PROCEDURE — 80053 COMPREHEN METABOLIC PANEL: CPT | Performed by: EMERGENCY MEDICINE

## 2017-09-23 PROCEDURE — 85610 PROTHROMBIN TIME: CPT

## 2017-09-23 PROCEDURE — 96366 THER/PROPH/DIAG IV INF ADDON: CPT

## 2017-09-23 PROCEDURE — 85610 PROTHROMBIN TIME: CPT | Performed by: EMERGENCY MEDICINE

## 2017-09-23 PROCEDURE — 80053 COMPREHEN METABOLIC PANEL: CPT

## 2017-09-23 PROCEDURE — 96375 TX/PRO/DX INJ NEW DRUG ADDON: CPT

## 2017-09-23 PROCEDURE — 85025 COMPLETE CBC W/AUTO DIFF WBC: CPT | Performed by: EMERGENCY MEDICINE

## 2017-09-23 PROCEDURE — 83880 ASSAY OF NATRIURETIC PEPTIDE: CPT | Performed by: EMERGENCY MEDICINE

## 2017-09-23 PROCEDURE — 71010 XR CHEST AP PORTABLE  (CPT=71010): CPT

## 2017-09-23 PROCEDURE — 99292 CRITICAL CARE ADDL 30 MIN: CPT

## 2017-09-23 PROCEDURE — 84484 ASSAY OF TROPONIN QUANT: CPT | Performed by: EMERGENCY MEDICINE

## 2017-09-23 PROCEDURE — 93005 ELECTROCARDIOGRAM TRACING: CPT

## 2017-09-23 PROCEDURE — 93010 ELECTROCARDIOGRAM REPORT: CPT

## 2017-09-23 PROCEDURE — 71010 XR CHEST AP PORTABLE  (CPT=71010): CPT | Performed by: EMERGENCY MEDICINE

## 2017-09-23 PROCEDURE — 85025 COMPLETE CBC W/AUTO DIFF WBC: CPT

## 2017-09-23 PROCEDURE — 99285 EMERGENCY DEPT VISIT HI MDM: CPT

## 2017-09-23 RX ORDER — WARFARIN SODIUM 2 MG/1
4 TABLET ORAL
Status: COMPLETED | OUTPATIENT
Start: 2017-09-23 | End: 2017-09-23

## 2017-09-23 RX ORDER — VITS A,C,E/LUTEIN/MINERALS 300MCG-200
1 TABLET ORAL 2 TIMES DAILY
Status: DISCONTINUED | OUTPATIENT
Start: 2017-09-23 | End: 2017-09-29

## 2017-09-23 RX ORDER — HYDROCHLOROTHIAZIDE 25 MG/1
25 TABLET ORAL DAILY
COMMUNITY
End: 2017-10-16 | Stop reason: ALTCHOICE

## 2017-09-23 RX ORDER — ALBUTEROL SULFATE 2.5 MG/3ML
2.5 SOLUTION RESPIRATORY (INHALATION) ONCE
Status: COMPLETED | OUTPATIENT
Start: 2017-09-23 | End: 2017-09-23

## 2017-09-23 RX ORDER — FUROSEMIDE 10 MG/ML
40 INJECTION INTRAMUSCULAR; INTRAVENOUS
Status: DISCONTINUED | OUTPATIENT
Start: 2017-09-23 | End: 2017-09-29

## 2017-09-23 RX ORDER — ONDANSETRON 2 MG/ML
4 INJECTION INTRAMUSCULAR; INTRAVENOUS EVERY 4 HOURS PRN
Status: DISCONTINUED | OUTPATIENT
Start: 2017-09-23 | End: 2017-09-23

## 2017-09-23 RX ORDER — LOSARTAN POTASSIUM 100 MG/1
100 TABLET ORAL DAILY
Status: DISCONTINUED | OUTPATIENT
Start: 2017-09-23 | End: 2017-09-29

## 2017-09-23 RX ORDER — FUROSEMIDE 10 MG/ML
20 INJECTION INTRAMUSCULAR; INTRAVENOUS ONCE
Status: COMPLETED | OUTPATIENT
Start: 2017-09-23 | End: 2017-09-23

## 2017-09-23 RX ORDER — DOCUSATE SODIUM 100 MG/1
100 CAPSULE, LIQUID FILLED ORAL 2 TIMES DAILY PRN
Status: DISCONTINUED | OUTPATIENT
Start: 2017-09-23 | End: 2017-09-29

## 2017-09-23 RX ORDER — NITROGLYCERIN 0.4 MG/1
0.4 TABLET SUBLINGUAL EVERY 5 MIN PRN
Status: DISCONTINUED | OUTPATIENT
Start: 2017-09-23 | End: 2017-09-29

## 2017-09-23 RX ORDER — POTASSIUM CHLORIDE 20 MEQ/1
40 TABLET, EXTENDED RELEASE ORAL ONCE
Status: COMPLETED | OUTPATIENT
Start: 2017-09-23 | End: 2017-09-23

## 2017-09-23 RX ORDER — TRAZODONE HYDROCHLORIDE 100 MG/1
100 TABLET ORAL NIGHTLY
Status: DISCONTINUED | OUTPATIENT
Start: 2017-09-23 | End: 2017-09-29

## 2017-09-23 RX ORDER — NITROGLYCERIN 20 MG/100ML
50 INJECTION INTRAVENOUS CONTINUOUS
Status: DISCONTINUED | OUTPATIENT
Start: 2017-09-23 | End: 2017-09-29

## 2017-09-23 RX ORDER — DILTIAZEM HYDROCHLORIDE 180 MG/1
180 CAPSULE, EXTENDED RELEASE ORAL DAILY
Status: DISCONTINUED | OUTPATIENT
Start: 2017-09-23 | End: 2017-09-29

## 2017-09-23 RX ORDER — PAROXETINE HYDROCHLORIDE 20 MG/1
10 TABLET, FILM COATED ORAL EVERY EVENING
Status: DISCONTINUED | OUTPATIENT
Start: 2017-09-23 | End: 2017-09-29

## 2017-09-23 RX ORDER — HYDROCHLOROTHIAZIDE 25 MG/1
25 TABLET ORAL DAILY
Status: DISCONTINUED | OUTPATIENT
Start: 2017-09-23 | End: 2017-09-29

## 2017-09-23 NOTE — ED NOTES
Round on pt. Pt unable to void again. Family at bedside. No distress noted. Pt removed from oxygen for trial period.

## 2017-09-23 NOTE — ED NOTES
Round on pt. Pt sleeping but arouseable with entry to room. Family at bedside. No distress noted.  Pt denies any needs

## 2017-09-23 NOTE — CONSULTS
120 [facility] Dosing Service  Warfarin (Coumadin) Initial Dosing    Laura Daniel is a 80year old female for whom pharmacy has been consulted to dose warfarin (COUMADIN) for afib  by Dr. Willis Rebollar. Based on this indication, goal INR is 2-3.     Surgery D

## 2017-09-23 NOTE — ED NOTES
Round on pt. Pt and family updated on room number and eta to floor. Room currently dirty at this time  Pt denies any needs.

## 2017-09-23 NOTE — ED NOTES
Pt to ED from [facility] for fatigue and dry cough. Pt denies any pain. Pt A/Ox3. 2L oxygen applied to pt. 85% on RA,. Pt denies any needs at this time.  Pt sts she does not wear any oxygen at home

## 2017-09-23 NOTE — ED NOTES
Room ready for pt. Transport paged. Pt and family updated on eta to floor. No distress noted from pt.  Pt sts she is feeling better since arrival.

## 2017-09-23 NOTE — ED PROVIDER NOTES
Patient Seen in: [facility] Emergency Department    History   Patient presents with:  Fatigue (constitutional, neurologic)    Stated Complaint: generalized weakness    HPI    Patient was feeling very tired and fatigued yesterday.   This morning she h (!) 205/109  Pulse: 56  Resp: 17  Temp: (!) 97.5 °F (36.4 °C)  Temp src: Temporal  SpO2: 95 %  O2 Device: Nasal cannula    Current:BP (!) 175/89   Pulse 74   Temp (!) 97.5 °F (36.4 °C) (Temporal)   Resp 20   Ht 154.9 cm (5' 1\")   Wt 66.2 kg   SpO2 95%   B Status                     ---------                               -----------         ------                     CBC W/ DIFFERENTIAL[[id number]]          Abnormal            Final result                 Please view results for these tests on the individual

## 2017-09-23 NOTE — H&P
[facility] Hospitalist History and Physical      Patient presents with:  Fatigue (constitutional, neurologic)       PCP: Irving Lutz MD      History of Present Illness: Patient is a 80year old female with PMH sig for PMH sig for HTN, Atrial fibrillation on co  age 45       Review of Systems  Comprehensive ROS reviewed and negative except for what is stated in HPI.       OBJECTIVE:  BP (!) 175/89   Pulse 74   Temp 97.7 °F (36.5 °C) (Oral)   Resp 20   Ht 5' 1\" (1.549 m)   Wt 146 lb (66.2 kg)   SpO2 95% calcified aorta. Bilateral pleural effusions and bibasilar atelectasis are increased. Degenerative changes and dextroscoliosis of thoracic spine and      CONCLUSION:  Bilateral pleural effusions and bibasilar atelectasis, increased compared to 4/19/17.

## 2017-09-24 PROCEDURE — 80048 BASIC METABOLIC PNL TOTAL CA: CPT | Performed by: INTERNAL MEDICINE

## 2017-09-24 PROCEDURE — 85610 PROTHROMBIN TIME: CPT | Performed by: HOSPITALIST

## 2017-09-24 PROCEDURE — 85025 COMPLETE CBC W/AUTO DIFF WBC: CPT | Performed by: HOSPITALIST

## 2017-09-24 PROCEDURE — 84132 ASSAY OF SERUM POTASSIUM: CPT | Performed by: HOSPITALIST

## 2017-09-24 PROCEDURE — 84484 ASSAY OF TROPONIN QUANT: CPT | Performed by: INTERNAL MEDICINE

## 2017-09-24 RX ORDER — POTASSIUM CHLORIDE 20 MEQ/1
40 TABLET, EXTENDED RELEASE ORAL EVERY 4 HOURS
Status: COMPLETED | OUTPATIENT
Start: 2017-09-24 | End: 2017-09-24

## 2017-09-24 RX ORDER — TRAZODONE HYDROCHLORIDE 50 MG/1
100 TABLET ORAL NIGHTLY
COMMUNITY
End: 2018-01-01

## 2017-09-24 RX ORDER — WARFARIN SODIUM 2 MG/1
4 TABLET ORAL
Status: COMPLETED | OUTPATIENT
Start: 2017-09-24 | End: 2017-09-24

## 2017-09-24 RX ORDER — WARFARIN SODIUM 6 MG/1
6 TABLET ORAL DAILY
Status: ON HOLD | COMMUNITY
End: 2017-09-25

## 2017-09-24 NOTE — PROGRESS NOTES
[facility] Hospitalist Progress Note                                                                   [facility]  1/17/1924    CC: Dyspnea    SUBJECTIVE:    States she feels SOB is better.  Debara Bosworth fibrillation on coumadin, HL, moderate MR, diastolic CHF who presented to ED with complaints of weakness.  CXR with bilateral pleural effusions     Dyspnea/hypoxia secondary to acute on chronic diastolic heart failure  -CXR personally reviewed, report with

## 2017-09-24 NOTE — CONSULTS
120 [facility] Dosing Service  Warfarin (Coumadin) Subsequent Dosing    Luis Betancourt is a 80year old female for whom pharmacy has been dosing warfarin (Coumadin).  Goal INR is 2-3    Recent Labs   Lab  09/23/17   0900  09/24/17   0523   INR  2.08*  2.31*

## 2017-09-24 NOTE — PROGRESS NOTES
[facility] Cardiology/[facility]  Progress Note    [place] Patient Status:  Inpatient    1924 MRN [id number]   [facility] 2NE-A Attending Johanne Jackman MD   Hosp Day # 1 PCP Pete Redmond MD     Assessment and Plan:     chron and dry.    Psych: Appropriate      Laboratory/Data:  Diagnostics:       TELE: BLADIMIR w/ good VR     Labs:    Lab Results  Component Value Date   WBC 10.2 09/24/2017   HGB 11.9 09/24/2017   HCT 36.3 09/24/2017   MCV 86.8 09/24/2017   .0 09/24/2017   TROP

## 2017-09-25 ENCOUNTER — APPOINTMENT (OUTPATIENT)
Dept: GENERAL RADIOLOGY | Facility: HOSPITAL | Age: 82
DRG: 292 | End: 2017-09-25
Attending: INTERNAL MEDICINE
Payer: MEDICARE

## 2017-09-25 PROCEDURE — 85610 PROTHROMBIN TIME: CPT | Performed by: HOSPITALIST

## 2017-09-25 PROCEDURE — 71010 XR CHEST AP PORTABLE  (CPT=71010): CPT | Performed by: INTERNAL MEDICINE

## 2017-09-25 PROCEDURE — 80048 BASIC METABOLIC PNL TOTAL CA: CPT | Performed by: INTERNAL MEDICINE

## 2017-09-25 PROCEDURE — 83735 ASSAY OF MAGNESIUM: CPT | Performed by: HOSPITALIST

## 2017-09-25 PROCEDURE — 84132 ASSAY OF SERUM POTASSIUM: CPT | Performed by: HOSPITALIST

## 2017-09-25 PROCEDURE — 85025 COMPLETE CBC W/AUTO DIFF WBC: CPT | Performed by: HOSPITALIST

## 2017-09-25 RX ORDER — WARFARIN SODIUM 4 MG/1
4 TABLET ORAL SEE ADMIN INSTRUCTIONS
Status: ON HOLD | COMMUNITY
End: 2018-01-01 | Stop reason: DRUGHIGH

## 2017-09-25 RX ORDER — FUROSEMIDE 20 MG/1
20 TABLET ORAL 2 TIMES DAILY
Status: ON HOLD | COMMUNITY
End: 2017-09-28

## 2017-09-25 RX ORDER — DILTIAZEM HYDROCHLORIDE 180 MG/1
180 CAPSULE, COATED, EXTENDED RELEASE ORAL DAILY
COMMUNITY

## 2017-09-25 RX ORDER — OLMESARTAN MEDOXOMIL 40 MG/1
40 TABLET ORAL DAILY
COMMUNITY

## 2017-09-25 RX ORDER — WARFARIN SODIUM 4 MG/1
6 TABLET ORAL SEE ADMIN INSTRUCTIONS
Status: ON HOLD | COMMUNITY
End: 2018-01-01 | Stop reason: DRUGHIGH

## 2017-09-25 RX ORDER — POTASSIUM CHLORIDE 20 MEQ/1
40 TABLET, EXTENDED RELEASE ORAL EVERY 4 HOURS
Status: DISCONTINUED | OUTPATIENT
Start: 2017-09-25 | End: 2017-09-25

## 2017-09-25 RX ORDER — PAROXETINE 10 MG/1
10 TABLET, FILM COATED ORAL EVERY EVENING
COMMUNITY
End: 2018-01-01

## 2017-09-25 RX ORDER — POTASSIUM CHLORIDE 20 MEQ/1
40 TABLET, EXTENDED RELEASE ORAL EVERY 4 HOURS
Status: COMPLETED | OUTPATIENT
Start: 2017-09-25 | End: 2017-09-25

## 2017-09-25 NOTE — CM/SW NOTE
SW met with patient to assess for discharge needs per CHF protocol. Patient resides alone at [address] [place] [address]. She reports full independence in ADL's prior to admission. Patient uses a walker to safely ambulate.    Patient has never had [address]

## 2017-09-25 NOTE — PROGRESS NOTES
[facility]  Cardiology Progress Note    Bryn Casiano Patient Status:  Inpatient    1924 MRN [id number]   [facility] 2NE-A Attending Sumi Bustamante, *   Hosp Day # 2 PCP Lurdes Flores MD     Assessment:  Flash Pulmonar Peripheral pulses are  2+, no edema.   Neurologic: Alert and oriented,  Gross motor and sensory modalities preserved  Psychiatric: Normal mood and affect  Skin: Warm and dry, no obvious rashes     MEDICATIONS:  • Warfarin Sodium  3 mg Oral Once at night   •

## 2017-09-25 NOTE — PROGRESS NOTES
Pt A&Ox 4, SPO2 96-97% on RA, A Fib on tele, rate controlled in 60's to 70's, up with standby assist with walker, denies any CP/SOB at this time, maintained on IV lasix BID, repeat potassium level @ 1700, will continue to monitor.

## 2017-09-25 NOTE — PROGRESS NOTES
[facility] Hospitalist Progress Note                                                                   [facility]  1/17/1924    CC:  Dyspnea    SUBJECTIVE:  Pt sitting up in chair, feels g 5422)     PRN: nitroGLYCERIN, docusate sodium    Assessment/Plan:  Principal Problem:    Acute on chronic congestive heart failure, unspecified congestive heart failure type ([facility] [address]      80year old female with PMH sig for HTN, Atrial fibrillation on coumadi

## 2017-09-26 PROCEDURE — 85610 PROTHROMBIN TIME: CPT | Performed by: HOSPITALIST

## 2017-09-26 PROCEDURE — 85025 COMPLETE CBC W/AUTO DIFF WBC: CPT | Performed by: HOSPITALIST

## 2017-09-26 PROCEDURE — 80048 BASIC METABOLIC PNL TOTAL CA: CPT | Performed by: HOSPITALIST

## 2017-09-26 PROCEDURE — 83735 ASSAY OF MAGNESIUM: CPT | Performed by: HOSPITALIST

## 2017-09-26 RX ORDER — POTASSIUM CHLORIDE 20 MEQ/1
40 TABLET, EXTENDED RELEASE ORAL ONCE
Status: COMPLETED | OUTPATIENT
Start: 2017-09-26 | End: 2017-09-26

## 2017-09-26 RX ORDER — WARFARIN SODIUM 2 MG/1
4 TABLET ORAL
Status: COMPLETED | OUTPATIENT
Start: 2017-09-26 | End: 2017-09-26

## 2017-09-26 NOTE — PLAN OF CARE
DX: Flash Pulmonary Edema; Acute on chronic diastolic CHF - per Dr. Radha Perez 9/25 note    Data: Pt lying in bed. Per PCT at change of shift pt was taking off her monitor & gown & stating she was going to go home. Pt does not recall doing this.   Activated b

## 2017-09-26 NOTE — PROGRESS NOTES
[facility] Hospitalist Progress Note                                                                   [facility]  1/17/1924    CC:  Dyspnea    SUBJECTIVE:  Pt laying in bed, no cp/sob/n QPM   • TraZODone HCl  100 mg Oral Nightly     Continuous Infusions:   • Nitroglycerin in D5W Stopped (09/24/17 0933)     PRN: magnesium hydroxide, nitroGLYCERIN, docusate sodium    Assessment/Plan:  Principal Problem:    Acute on chronic congestive heart

## 2017-09-26 NOTE — PLAN OF CARE
Problem: Patient/Family Goals  Goal: Patient/Family Long Term Goal  Patient's Long Term Goal: return home. Interventions: CXR, IV lasix, pharmacy to dose coumadin, EKG, nitro drip, cardiology consult.    - See additional Care Plan goals for specific inte breathe, Incentive Spirometry  - Assess the need for suctioning and perform as needed  - Assess and instruct to report SOB or any respiratory difficulty  - Respiratory Therapy support as indicated  - Manage/alleviate anxiety  - Monitor for signs/symptoms o

## 2017-09-26 NOTE — PLAN OF CARE
Pt's Sao2 84% with good pleth while asleep. 2L NC applied Sao2 96% 2L NC. Will continue to monitor.  remains in place.

## 2017-09-26 NOTE — PROGRESS NOTES
[facility]  Cardiology Progress Note    Alex Michael Patient Status:  Inpatient    1924 MRN [id number]   [facility] 2NE-A Attending Aliyah Jaime, *   Hosp Day # 3 PCP Gautam Granados MD     Assessment:  Flash Pulmonar irreg, normal S1 S2, 2-33/6 syst murmur at base. No rub/gallops  Lungs:  Rales left base, diminished and dull to percussion right base. No wheezes  Abdomen: Soft, non-tender, non-distended.   No hepatosplenomegaly, masses  Extremities:  Peripheral pulses

## 2017-09-26 NOTE — DIETARY NOTE
Nutrition Short Note   Dietitian consult received per heart failure standing order. Reviewed sodium and fluid guidelines. Pt receptive. Pt verbalizes understanding and has no further questions at this time. Expect good compliance after discharge.  RD avail

## 2017-09-26 NOTE — CONSULTS
120 [facility] Dosing Service  Warfarin (Coumadin) Subsequent Dosing    Laura Daniel is a 80year old female for whom pharmacy has been dosing warfarin (Coumadin).  Goal INR is 2-3    Recent Labs   Lab  09/24/17   0523  09/25/17   0530  09/26/17   0551

## 2017-09-27 ENCOUNTER — APPOINTMENT (OUTPATIENT)
Dept: GENERAL RADIOLOGY | Facility: HOSPITAL | Age: 82
DRG: 292 | End: 2017-09-27
Attending: INTERNAL MEDICINE
Payer: MEDICARE

## 2017-09-27 PROCEDURE — 71020 XR CHEST PA + LAT CHEST (CPT=71020): CPT | Performed by: INTERNAL MEDICINE

## 2017-09-27 PROCEDURE — 97161 PT EVAL LOW COMPLEX 20 MIN: CPT

## 2017-09-27 PROCEDURE — 83735 ASSAY OF MAGNESIUM: CPT | Performed by: HOSPITALIST

## 2017-09-27 PROCEDURE — 80048 BASIC METABOLIC PNL TOTAL CA: CPT | Performed by: HOSPITALIST

## 2017-09-27 PROCEDURE — 71035 XR CHEST DECUBITUS (CPT=71035): CPT | Performed by: INTERNAL MEDICINE

## 2017-09-27 PROCEDURE — 85610 PROTHROMBIN TIME: CPT | Performed by: HOSPITALIST

## 2017-09-27 PROCEDURE — 85025 COMPLETE CBC W/AUTO DIFF WBC: CPT | Performed by: HOSPITALIST

## 2017-09-27 RX ORDER — POTASSIUM CHLORIDE 20 MEQ/1
40 TABLET, EXTENDED RELEASE ORAL ONCE
Status: COMPLETED | OUTPATIENT
Start: 2017-09-27 | End: 2017-09-27

## 2017-09-27 RX ORDER — WARFARIN SODIUM 5 MG/1
5 TABLET ORAL
Status: COMPLETED | OUTPATIENT
Start: 2017-09-27 | End: 2017-09-27

## 2017-09-27 NOTE — PHYSICAL THERAPY NOTE
PHYSICAL THERAPY QUICK EVALUATION - INPATIENT    Room Number: 2606/2606-A  Evaluation Date: 9/27/2017  Presenting Problem: acute on chronic CHF  Physician Order: PT Eval and Treat    Problem List  Principal Problem:    Acute on chronic congestive heart f extremity strength is within functional limits     NEUROLOGICAL FINDINGS  Neurological Findings: None                   AM-PAC '6-Clicks' INPATIENT SHORT FORM - BASIC MOBILITY  How much difficulty does the patient currently have. ..  -   Turning over in bed assessment findings, goals and expectations. Patient End of Session: Up in chair;Needs met;Call light within reach;RN aware of session/findings; All patient questions and concerns addressed    ASSESSMENT   Patient is a 80year old female admitted on

## 2017-09-27 NOTE — PHYSICAL THERAPY NOTE
attd' to see for PT eval. Pt had just recd breakfast and requesting PT to return after. Will re attempt.

## 2017-09-27 NOTE — CONSULTS
Pharmacy Dosing Service: Warfarin (Coumadin)    Mega Crooks is a 80year old female for whom pharmacy has been dosing warfarin (Coumadin).  Goal INR is 2-3    Recent Labs   Lab  09/25/17   0530  09/26/17   0551  09/27/17   0938   INR  2.53*  2.33*  2.3

## 2017-09-27 NOTE — PROGRESS NOTES
[facility]  Cardiology Progress Note    Alex Michael Patient Status:  Inpatient    1924 MRN [id number]   [facility] 2NE-A Attending Aliyah Jaime, *   Hosp Day # 4 PCP Gautam Granados MD     Assessment:  Flash Pulmonar S2, no murmurs/gallops  Lungs: rales on left, diminished BS right base. No wheezes  Abdomen: Soft, non-tender, non-distended. No hepatosplenomegaly, masses  Extremities:  Peripheral pulses are  2+, no edema.   Neurologic: Alert and oriented,  Gross motor

## 2017-09-27 NOTE — PLAN OF CARE
pCARDIOVASCULAR - ADULT    • Maintains optimal cardiac output and hemodynamic stability Progressing    • Absence of cardiac arrhythmias or at baseline Progressing        Patient/Family Goals    • Patient/Family Long Term Goal Progressing    • Patient/Famil

## 2017-09-27 NOTE — PROGRESS NOTES
[facility] Hospitalist Progress Note                                                                   [facility]  1/17/1924    CC:  Dyspnea    SUBJECTIVE:  Sitting up in chair, olinda PARoxetine HCl  10 mg Oral QPM   • TraZODone HCl  100 mg Oral Nightly     Continuous Infusions:   • Nitroglycerin in D5W Stopped (09/24/17 0933)     PRN: magnesium hydroxide, nitroGLYCERIN, docusate sodium    Assessment/Plan:  Principal Problem:    Acute o

## 2017-09-28 PROCEDURE — 85610 PROTHROMBIN TIME: CPT | Performed by: INTERNAL MEDICINE

## 2017-09-28 PROCEDURE — 83735 ASSAY OF MAGNESIUM: CPT | Performed by: HOSPITALIST

## 2017-09-28 PROCEDURE — 84132 ASSAY OF SERUM POTASSIUM: CPT | Performed by: HOSPITALIST

## 2017-09-28 PROCEDURE — 80048 BASIC METABOLIC PNL TOTAL CA: CPT | Performed by: INTERNAL MEDICINE

## 2017-09-28 RX ORDER — POTASSIUM CHLORIDE 20 MEQ/1
40 TABLET, EXTENDED RELEASE ORAL EVERY 4 HOURS
Status: COMPLETED | OUTPATIENT
Start: 2017-09-28 | End: 2017-09-28

## 2017-09-28 RX ORDER — FUROSEMIDE 20 MG/1
40 TABLET ORAL 2 TIMES DAILY
Qty: 120 TABLET | Refills: 3 | Status: SHIPPED | OUTPATIENT
Start: 2017-09-28 | End: 2018-01-24

## 2017-09-28 RX ORDER — WARFARIN SODIUM 2 MG/1
4 TABLET ORAL
Status: COMPLETED | OUTPATIENT
Start: 2017-09-28 | End: 2017-09-28

## 2017-09-28 NOTE — PROGRESS NOTES
[facility] Hospitalist Progress Note                                                                   [facility]  1/17/1924    CC: Dyspnea    SUBJECTIVE:  Pt laying in chair.  No sob/ TraZODone HCl  100 mg Oral Nightly     Continuous Infusions:   • Nitroglycerin in D5W Stopped (09/24/17 0933)     PRN: magnesium hydroxide, nitroGLYCERIN, docusate sodium    Assessment/Plan:  Principal Problem:    Acute on chronic congestive heart failure,

## 2017-09-28 NOTE — CM/SW NOTE
Notified by primary rn of d/c today. Call to [facility] [place] [phone] to inform of patient's discharge today. Daughter at bedside will drive patient.      Isak Sebastian RN,   Phone [phone]  Pager [phone]

## 2017-09-28 NOTE — PROGRESS NOTES
Cards:    FU: CHF    Doing well. Denies any SOB. Ambulating yesterday, notes without difficulty. No CP.       Current Facility-Administered Medications:  Warfarin Sodium (COUMADIN) tab 4 mg 4 mg Oral Once at night   magnesium hydroxide (MILK OF MAG discharge if ambulating and has help at home. Would convert to PO lasix 40mg PO BID. FU NP/Carney in 1 week. Could continue other cardiac meds. 2. AF: Rates mainly 50s-70s. BP stable. Continue current meds. Coumadin for AC.     3. HTN: BP controlled

## 2017-09-28 NOTE — CONSULTS
120 [facility] Dosing Service  Warfarin (Coumadin) Subsequent Dosing    Alex Michael is a 80year old female with a h/o afib for whom pharmacy has been dosing warfarin (Coumadin) per consult from Dr Claudia Cano.  Goal INR is 2-3    Recent Labs   Lab  09/26/1

## 2017-09-28 NOTE — PLAN OF CARE
Problem: Patient/Family Goals  Goal: Patient/Family Long Term Goal  Patient's Long Term Goal: return home. Interventions: CXR, IV lasix, pharmacy to dose coumadin, EKG, nitro drip, cardiology consult.    - See additional Care Plan goals for specific inte broncho-pulmonary hygiene including cough, deep breathe, Incentive Spirometry  - Assess the need for suctioning and perform as needed  - Assess and instruct to report SOB or any respiratory difficulty  - Respiratory Therapy support as indicated  - Manage/a

## 2017-09-29 VITALS
DIASTOLIC BLOOD PRESSURE: 60 MMHG | OXYGEN SATURATION: 95 % | RESPIRATION RATE: 18 BRPM | HEART RATE: 57 BPM | WEIGHT: 134.69 LBS | TEMPERATURE: 98 F | HEIGHT: 61 IN | BODY MASS INDEX: 25.43 KG/M2 | SYSTOLIC BLOOD PRESSURE: 120 MMHG

## 2017-09-29 PROCEDURE — 80048 BASIC METABOLIC PNL TOTAL CA: CPT | Performed by: INTERNAL MEDICINE

## 2017-09-29 PROCEDURE — 85610 PROTHROMBIN TIME: CPT | Performed by: INTERNAL MEDICINE

## 2017-09-29 RX ORDER — POTASSIUM CHLORIDE 20 MEQ/1
40 TABLET, EXTENDED RELEASE ORAL EVERY 4 HOURS
Status: COMPLETED | OUTPATIENT
Start: 2017-09-29 | End: 2017-09-29

## 2017-09-29 NOTE — CM/SW NOTE
SW met with patient and daughter per her request. They have hired a Private Duty CG 4 hrs/day and were inquiring if CG services would be covered by [employer]. SW reviewed Home Health and Private Duty CG services at length.  Patient and radha

## 2017-09-29 NOTE — DISCHARGE SUMMARY
[facility] Internal Medicine Discharge Summary   Patient ID:  Leland De Paz  [id number]  89 year old  1/17/1924    Admit date: 9/23/2017    Discharge date and time: 9/29/2017     Attending Physician: Elham Bejarano MD     Primary Care Physician: Petra Duarte A fib  -BNP elevated  -diuresis per cards, lungs improving, crackles L base; PO lasix 40mg BID on dc  -Continue BB, ARB  CXR 9/27 with improvement in effusion      HTN/HL, afib on coumadin, moderate MR  -continue CCB, BB, ARB  -pharmacy to dose coumadin. as: COUMADIN     * Warfarin Sodium 4 MG Tabs  Commonly known as:  COUMADIN        * This list has 2 medication(s) that are the same as other medications prescribed for you.  Read the directions carefully, and ask your doctor or other care provider to revie and lateral chest radiographs were obtained. PATIENT STATED HISTORY: (As transcribed by Technologist)     FINDINGS:   There is moderate cardiomegaly without venous congestion. There is mild peribronchial thickening diffusely.  There is marked vascular calc Degenerative changes and dextroscoliosis of thoracic spine and      CONCLUSION:  Bilateral pleural effusions and bibasilar atelectasis, increased compared to 4/19/17.     Dictated by: Nidia Lai MD on 9/23/2017 at 9:19     Approved by: Nidia Lai MD

## 2017-09-29 NOTE — PROGRESS NOTES
[facility]  Cardiology Progress Note    Sydnee Mata Patient Status:  Inpatient    1924 MRN [id number]   [facility] 2NE-A Attending Pao Phipps MD   [facility] Day # 6 PCP Pete Redmond MD     Assessment:  Flash Pulmonary edema.   Neurologic: Alert and oriented,  Gross motor and sensory modalities preserved  Psychiatric: Normal mood and affect  Skin: Warm and dry, no obvious rashes     MEDICATIONS:  • furosemide  40 mg Intravenous BID (Diuretic)   • DilTIAZem HCl ER Coated B

## 2017-09-29 NOTE — CM/SW NOTE
Informed patient is ready for d/c, call to [facility] [phone] to inform of patient;s return.     Aixa Carmona RN,   Phone [phone]  Pager [phone]

## 2018-01-01 ENCOUNTER — APPOINTMENT (OUTPATIENT)
Dept: CT IMAGING | Facility: HOSPITAL | Age: 83
DRG: 177 | End: 2018-01-01
Attending: INTERNAL MEDICINE
Payer: MEDICARE

## 2018-01-01 ENCOUNTER — NURSE ONLY (OUTPATIENT)
Dept: LAB | Age: 83
End: 2018-01-01
Attending: INTERNAL MEDICINE
Payer: MEDICARE

## 2018-01-01 ENCOUNTER — APPOINTMENT (OUTPATIENT)
Dept: LAB | Age: 83
End: 2018-01-01
Attending: INTERNAL MEDICINE
Payer: MEDICARE

## 2018-01-01 ENCOUNTER — APPOINTMENT (OUTPATIENT)
Dept: GENERAL RADIOLOGY | Facility: HOSPITAL | Age: 83
DRG: 177 | End: 2018-01-01
Attending: INTERNAL MEDICINE
Payer: MEDICARE

## 2018-01-01 ENCOUNTER — HOSPITAL ENCOUNTER (EMERGENCY)
Facility: HOSPITAL | Age: 83
Discharge: HOME | DRG: 177 | End: 2018-01-01
Attending: EMERGENCY MEDICINE
Payer: MEDICARE

## 2018-01-01 ENCOUNTER — APPOINTMENT (OUTPATIENT)
Dept: GENERAL RADIOLOGY | Facility: HOSPITAL | Age: 83
DRG: 177 | End: 2018-01-01
Payer: MEDICARE

## 2018-01-01 ENCOUNTER — LAB REQUISITION (OUTPATIENT)
Dept: LAB | Facility: HOSPITAL | Age: 83
End: 2018-01-01
Attending: INTERNAL MEDICINE
Payer: MEDICARE

## 2018-01-01 ENCOUNTER — APPOINTMENT (OUTPATIENT)
Dept: LAB | Age: 83
End: 2018-01-01
Attending: NURSE PRACTITIONER
Payer: MEDICARE

## 2018-01-01 ENCOUNTER — HOSPITAL ENCOUNTER (OUTPATIENT)
Dept: GENERAL RADIOLOGY | Facility: HOSPITAL | Age: 83
Discharge: HOME | End: 2018-01-01
Attending: NURSE PRACTITIONER
Payer: MEDICARE

## 2018-01-01 ENCOUNTER — APPOINTMENT (OUTPATIENT)
Dept: GENERAL RADIOLOGY | Facility: HOSPITAL | Age: 83
DRG: 177 | End: 2018-01-01
Attending: EMERGENCY MEDICINE
Payer: MEDICARE

## 2018-01-01 ENCOUNTER — APPOINTMENT (OUTPATIENT)
Dept: CV DIAGNOSTICS | Facility: HOSPITAL | Age: 83
DRG: 177 | End: 2018-01-01
Attending: INTERNAL MEDICINE
Payer: MEDICARE

## 2018-01-01 ENCOUNTER — HOSPITAL ENCOUNTER (INPATIENT)
Facility: HOSPITAL | Age: 83
LOS: 6 days | Discharge: NURSING, REHAB OR LONG-TERM CARE | DRG: 177 | End: 2018-01-01
Admitting: INTERNAL MEDICINE
Payer: MEDICARE

## 2018-01-01 ENCOUNTER — LAB ENCOUNTER (OUTPATIENT)
Dept: LAB | Age: 83
End: 2018-01-01
Attending: NURSE PRACTITIONER
Payer: MEDICARE

## 2018-01-01 ENCOUNTER — APPOINTMENT (OUTPATIENT)
Dept: LAB | Facility: HOSPITAL | Age: 83
End: 2018-01-01
Attending: NURSE PRACTITIONER
Payer: MEDICARE

## 2018-01-01 VITALS
OXYGEN SATURATION: 93 % | SYSTOLIC BLOOD PRESSURE: 180 MMHG | TEMPERATURE: 98 F | BODY MASS INDEX: 21.49 KG/M2 | RESPIRATION RATE: 16 BRPM | WEIGHT: 129 LBS | HEIGHT: 65 IN | HEART RATE: 52 BPM | DIASTOLIC BLOOD PRESSURE: 78 MMHG

## 2018-01-01 VITALS
OXYGEN SATURATION: 95 % | RESPIRATION RATE: 20 BRPM | WEIGHT: 121.06 LBS | DIASTOLIC BLOOD PRESSURE: 91 MMHG | BODY MASS INDEX: 20.17 KG/M2 | HEART RATE: 68 BPM | SYSTOLIC BLOOD PRESSURE: 168 MMHG | HEIGHT: 65 IN | TEMPERATURE: 98 F

## 2018-01-01 DIAGNOSIS — I48.91 ATRIAL FIBRILLATION (HCC): ICD-10-CM

## 2018-01-01 DIAGNOSIS — Z79.01 MONITORING FOR LONG-TERM ANTICOAGULANT USE: ICD-10-CM

## 2018-01-01 DIAGNOSIS — J18.9 ACUTE PNEUMONIA: Primary | ICD-10-CM

## 2018-01-01 DIAGNOSIS — Z51.81 MONITORING FOR LONG-TERM ANTICOAGULANT USE: ICD-10-CM

## 2018-01-01 DIAGNOSIS — Z79.01 LONG TERM CURRENT USE OF ANTICOAGULANT: ICD-10-CM

## 2018-01-01 DIAGNOSIS — Z51.81 ENCOUNTER FOR THERAPEUTIC DRUG LEVEL MONITORING: ICD-10-CM

## 2018-01-01 DIAGNOSIS — I10 ESSENTIAL HYPERTENSION: ICD-10-CM

## 2018-01-01 DIAGNOSIS — R69 DIAGNOSIS UNKNOWN: Primary | ICD-10-CM

## 2018-01-01 DIAGNOSIS — I50.9 ACUTE ON CHRONIC CONGESTIVE HEART FAILURE, UNSPECIFIED HEART FAILURE TYPE (HCC): ICD-10-CM

## 2018-01-01 DIAGNOSIS — I48.20 CHRONIC ATRIAL FIBRILLATION (HCC): ICD-10-CM

## 2018-01-01 DIAGNOSIS — J18.1 LUNG CONSOLIDATION (HCC): ICD-10-CM

## 2018-01-01 DIAGNOSIS — R05.9 COUGH: ICD-10-CM

## 2018-01-01 DIAGNOSIS — I50.30 DIASTOLIC HEART FAILURE (HCC): ICD-10-CM

## 2018-01-01 DIAGNOSIS — I50.32 CHRONIC DIASTOLIC HEART FAILURE (HCC): ICD-10-CM

## 2018-01-01 DIAGNOSIS — J90 PLEURAL EFFUSION: ICD-10-CM

## 2018-01-01 DIAGNOSIS — I34.0 NON-RHEUMATIC MITRAL REGURGITATION: ICD-10-CM

## 2018-01-01 DIAGNOSIS — J18.9 COMMUNITY ACQUIRED PNEUMONIA, UNSPECIFIED LATERALITY: Primary | ICD-10-CM

## 2018-01-01 LAB
ANION GAP SERPL CALC-SCNC: 7 MMOL/L (ref 0–18)
ANION GAP SERPL CALC-SCNC: 8 MMOL/L (ref 0–18)
BUN BLD-MCNC: 13 MG/DL (ref 8–20)
BUN BLD-MCNC: 20 MG/DL (ref 8–20)
BUN/CREAT SERPL: 16.7 (ref 10–20)
BUN/CREAT SERPL: 21.3 (ref 10–20)
CALCIUM BLD-MCNC: 9.1 MG/DL (ref 8.3–10.3)
CALCIUM BLD-MCNC: 9.4 MG/DL (ref 8.3–10.3)
CHLORIDE SERPL-SCNC: 100 MMOL/L (ref 101–111)
CHLORIDE SERPL-SCNC: 99 MMOL/L (ref 101–111)
CO2 SERPL-SCNC: 28 MMOL/L (ref 22–32)
CO2 SERPL-SCNC: 33 MMOL/L (ref 22–32)
CREAT BLD-MCNC: 0.78 MG/DL (ref 0.55–1.02)
CREAT BLD-MCNC: 0.94 MG/DL (ref 0.55–1.02)
GLUCOSE BLD-MCNC: 101 MG/DL (ref 70–99)
GLUCOSE BLD-MCNC: 121 MG/DL (ref 70–99)
OSMOLALITY SERPL CALC.SUM OF ELEC: 282 MOSM/KG (ref 275–295)
OSMOLALITY SERPL CALC.SUM OF ELEC: 292 MOSM/KG (ref 275–295)
POC ANTICOAG MACHINE PASS CONTROL: YES
POC INR: 1.2 (ref 0.8–1.3)
POC INR: 1.5 (ref 0.8–1.3)
POC INR: 2.3 (ref 0.8–1.3)
POC INR: 2.5 (ref 0.8–1.3)
POTASSIUM SERPL-SCNC: 4 MMOL/L (ref 3.6–5.1)
POTASSIUM SERPL-SCNC: 4 MMOL/L (ref 3.6–5.1)
PROCALCITONIN SERPL-MCNC: <0.11 NG/ML
SODIUM SERPL-SCNC: 136 MMOL/L (ref 136–144)
SODIUM SERPL-SCNC: 139 MMOL/L (ref 136–144)

## 2018-01-01 PROCEDURE — 99284 EMERGENCY DEPT VISIT MOD MDM: CPT

## 2018-01-01 PROCEDURE — 74230 X-RAY XM SWLNG FUNCJ C+: CPT | Performed by: INTERNAL MEDICINE

## 2018-01-01 PROCEDURE — 36415 COLL VENOUS BLD VENIPUNCTURE: CPT

## 2018-01-01 PROCEDURE — 71046 X-RAY EXAM CHEST 2 VIEWS: CPT | Performed by: NURSE PRACTITIONER

## 2018-01-01 PROCEDURE — 71275 CT ANGIOGRAPHY CHEST: CPT | Performed by: INTERNAL MEDICINE

## 2018-01-01 PROCEDURE — 36415 COLL VENOUS BLD VENIPUNCTURE: CPT | Performed by: INTERNAL MEDICINE

## 2018-01-01 PROCEDURE — 99222 1ST HOSP IP/OBS MODERATE 55: CPT | Performed by: CLINICAL NURSE SPECIALIST

## 2018-01-01 PROCEDURE — 84145 PROCALCITONIN (PCT): CPT

## 2018-01-01 PROCEDURE — 85610 PROTHROMBIN TIME: CPT | Performed by: INTERNAL MEDICINE

## 2018-01-01 PROCEDURE — 85610 PROTHROMBIN TIME: CPT

## 2018-01-01 PROCEDURE — 93306 TTE W/DOPPLER COMPLETE: CPT | Performed by: INTERNAL MEDICINE

## 2018-01-01 PROCEDURE — 36415 COLL VENOUS BLD VENIPUNCTURE: CPT | Performed by: NURSE PRACTITIONER

## 2018-01-01 PROCEDURE — 99231 SBSQ HOSP IP/OBS SF/LOW 25: CPT | Performed by: CLINICAL NURSE SPECIALIST

## 2018-01-01 PROCEDURE — 71045 X-RAY EXAM CHEST 1 VIEW: CPT

## 2018-01-01 PROCEDURE — 71045 X-RAY EXAM CHEST 1 VIEW: CPT | Performed by: EMERGENCY MEDICINE

## 2018-01-01 PROCEDURE — 80048 BASIC METABOLIC PNL TOTAL CA: CPT

## 2018-01-01 PROCEDURE — 85025 COMPLETE CBC W/AUTO DIFF WBC: CPT | Performed by: NURSE PRACTITIONER

## 2018-01-01 PROCEDURE — 86480 TB TEST CELL IMMUN MEASURE: CPT | Performed by: INTERNAL MEDICINE

## 2018-01-01 PROCEDURE — 73060 X-RAY EXAM OF HUMERUS: CPT | Performed by: EMERGENCY MEDICINE

## 2018-01-01 PROCEDURE — 96365 THER/PROPH/DIAG IV INF INIT: CPT

## 2018-01-01 RX ORDER — ARIPIPRAZOLE 15 MG/1
40 TABLET ORAL ONCE
Status: COMPLETED | OUTPATIENT
Start: 2018-01-01 | End: 2018-01-01

## 2018-01-01 RX ORDER — METOCLOPRAMIDE HYDROCHLORIDE 5 MG/ML
5 INJECTION INTRAMUSCULAR; INTRAVENOUS EVERY 8 HOURS PRN
Status: DISCONTINUED | OUTPATIENT
Start: 2018-01-01 | End: 2018-01-01

## 2018-01-01 RX ORDER — AMOXICILLIN AND CLAVULANATE POTASSIUM 875; 125 MG/1; MG/1
1 TABLET, FILM COATED ORAL 2 TIMES DAILY
Qty: 20 TABLET | Refills: 0 | Status: ON HOLD | OUTPATIENT
Start: 2018-01-01 | End: 2018-01-01

## 2018-01-01 RX ORDER — METOPROLOL TARTRATE 5 MG/5ML
5 INJECTION INTRAVENOUS EVERY 6 HOURS PRN
Status: DISCONTINUED | OUTPATIENT
Start: 2018-01-01 | End: 2018-01-01

## 2018-01-01 RX ORDER — WARFARIN SODIUM 2 MG/1
2 TABLET ORAL
Status: COMPLETED | OUTPATIENT
Start: 2018-01-01 | End: 2018-01-01

## 2018-01-01 RX ORDER — ARIPIPRAZOLE 15 MG/1
40 TABLET ORAL EVERY 4 HOURS
Status: COMPLETED | OUTPATIENT
Start: 2018-01-01 | End: 2018-01-01

## 2018-01-01 RX ORDER — IPRATROPIUM BROMIDE AND ALBUTEROL SULFATE 2.5; .5 MG/3ML; MG/3ML
3 SOLUTION RESPIRATORY (INHALATION) EVERY 4 HOURS PRN
Status: DISCONTINUED | OUTPATIENT
Start: 2018-01-01 | End: 2018-01-01

## 2018-01-01 RX ORDER — POTASSIUM CHLORIDE 20 MEQ/1
40 TABLET, EXTENDED RELEASE ORAL EVERY 4 HOURS
Status: DISCONTINUED | OUTPATIENT
Start: 2018-01-01 | End: 2018-01-01

## 2018-01-01 RX ORDER — BENZONATATE 100 MG/1
100 CAPSULE ORAL 3 TIMES DAILY PRN
Status: DISCONTINUED | OUTPATIENT
Start: 2018-01-01 | End: 2018-01-01

## 2018-01-01 RX ORDER — WARFARIN SODIUM 2 MG/1
4 TABLET ORAL
Status: COMPLETED | OUTPATIENT
Start: 2018-01-01 | End: 2018-01-01

## 2018-01-01 RX ORDER — GUAIFENESIN 600 MG
600 TABLET, EXTENDED RELEASE 12 HR ORAL 2 TIMES DAILY
Status: DISCONTINUED | OUTPATIENT
Start: 2018-01-01 | End: 2018-01-01

## 2018-01-01 RX ORDER — ONDANSETRON 2 MG/ML
4 INJECTION INTRAMUSCULAR; INTRAVENOUS EVERY 6 HOURS PRN
Status: DISCONTINUED | OUTPATIENT
Start: 2018-01-01 | End: 2018-01-01

## 2018-01-01 RX ORDER — POTASSIUM CHLORIDE 20 MEQ/1
40 TABLET, EXTENDED RELEASE ORAL ONCE
Status: COMPLETED | OUTPATIENT
Start: 2018-01-01 | End: 2018-01-01

## 2018-01-01 RX ORDER — ACETAMINOPHEN 500 MG
1000 TABLET ORAL ONCE
Status: COMPLETED | OUTPATIENT
Start: 2018-01-01 | End: 2018-01-01

## 2018-01-01 RX ORDER — PAROXETINE 10 MG/1
10 TABLET, FILM COATED ORAL NIGHTLY
Status: DISCONTINUED | OUTPATIENT
Start: 2018-01-01 | End: 2018-01-01

## 2018-01-01 RX ORDER — WARFARIN SODIUM 2 MG/1
4 TABLET ORAL
Status: DISCONTINUED | OUTPATIENT
Start: 2018-01-01 | End: 2018-01-01

## 2018-01-01 RX ORDER — IPRATROPIUM BROMIDE AND ALBUTEROL SULFATE 2.5; .5 MG/3ML; MG/3ML
3 SOLUTION RESPIRATORY (INHALATION) ONCE
Status: COMPLETED | OUTPATIENT
Start: 2018-01-01 | End: 2018-01-01

## 2018-01-01 RX ORDER — MAGNESIUM OXIDE 400 MG (241.3 MG MAGNESIUM) TABLET
400 TABLET ONCE
Status: COMPLETED | OUTPATIENT
Start: 2018-01-01 | End: 2018-01-01

## 2018-01-01 RX ORDER — FUROSEMIDE 10 MG/ML
40 INJECTION INTRAMUSCULAR; INTRAVENOUS ONCE
Status: COMPLETED | OUTPATIENT
Start: 2018-01-01 | End: 2018-01-01

## 2018-01-01 RX ORDER — FUROSEMIDE 10 MG/ML
20 INJECTION INTRAMUSCULAR; INTRAVENOUS DAILY
Status: DISCONTINUED | OUTPATIENT
Start: 2018-01-01 | End: 2018-01-01

## 2018-01-01 RX ORDER — DILTIAZEM HYDROCHLORIDE 180 MG/1
180 CAPSULE, EXTENDED RELEASE ORAL DAILY
Status: DISCONTINUED | OUTPATIENT
Start: 2018-01-01 | End: 2018-01-01

## 2018-01-01 RX ORDER — FUROSEMIDE 40 MG/1
40 TABLET ORAL DAILY
Status: DISCONTINUED | OUTPATIENT
Start: 2018-01-01 | End: 2018-01-01

## 2018-01-01 RX ORDER — SODIUM PHOSPHATE, DIBASIC AND SODIUM PHOSPHATE, MONOBASIC 7; 19 G/133ML; G/133ML
1 ENEMA RECTAL ONCE AS NEEDED
Status: DISCONTINUED | OUTPATIENT
Start: 2018-01-01 | End: 2018-01-01

## 2018-01-01 RX ORDER — ACETAMINOPHEN 325 MG/1
650 TABLET ORAL EVERY 6 HOURS PRN
Status: DISCONTINUED | OUTPATIENT
Start: 2018-01-01 | End: 2018-01-01

## 2018-01-01 RX ORDER — POLYETHYLENE GLYCOL 3350 17 G/17G
17 POWDER, FOR SOLUTION ORAL DAILY PRN
Status: DISCONTINUED | OUTPATIENT
Start: 2018-01-01 | End: 2018-01-01

## 2018-01-01 RX ORDER — BISACODYL 10 MG
10 SUPPOSITORY, RECTAL RECTAL
Status: DISCONTINUED | OUTPATIENT
Start: 2018-01-01 | End: 2018-01-01

## 2018-01-05 ENCOUNTER — NURSE ONLY (OUTPATIENT)
Dept: LAB | Age: 83
End: 2018-01-05
Attending: INTERNAL MEDICINE
Payer: MEDICARE

## 2018-01-05 DIAGNOSIS — Z51.81 ENCOUNTER FOR THERAPEUTIC DRUG MONITORING: Primary | ICD-10-CM

## 2018-01-05 DIAGNOSIS — Z79.01 LONG TERM (CURRENT) USE OF ANTICOAGULANTS: ICD-10-CM

## 2018-01-05 LAB
INR BLD: 5.78 (ref 0.89–1.11)
PSA SERPL DL<=0.01 NG/ML-MCNC: 53.7 SECONDS (ref 12–14.3)

## 2018-01-05 PROCEDURE — 85610 PROTHROMBIN TIME: CPT

## 2018-01-05 PROCEDURE — 36415 COLL VENOUS BLD VENIPUNCTURE: CPT

## 2018-01-08 ENCOUNTER — NURSE ONLY (OUTPATIENT)
Dept: LAB | Age: 83
End: 2018-01-08
Attending: FAMILY MEDICINE
Payer: MEDICARE

## 2018-01-08 DIAGNOSIS — Z79.01 LONG TERM CURRENT USE OF ANTICOAGULANT THERAPY: Primary | ICD-10-CM

## 2018-01-08 LAB
INR BLD: 2.54 (ref 0.89–1.11)
PSA SERPL DL<=0.01 NG/ML-MCNC: 27.8 SECONDS (ref 12–14.3)

## 2018-01-08 PROCEDURE — 36415 COLL VENOUS BLD VENIPUNCTURE: CPT

## 2018-01-08 PROCEDURE — 85610 PROTHROMBIN TIME: CPT

## 2018-01-18 ENCOUNTER — LAB REQUISITION (OUTPATIENT)
Dept: LAB | Facility: HOSPITAL | Age: 83
End: 2018-01-18
Attending: INTERNAL MEDICINE
Payer: MEDICARE

## 2018-01-18 DIAGNOSIS — I48.91 ATRIAL FIBRILLATION (HCC): ICD-10-CM

## 2018-01-18 DIAGNOSIS — Z51.81 ENCOUNTER FOR THERAPEUTIC DRUG LEVEL MONITORING: ICD-10-CM

## 2018-01-18 DIAGNOSIS — Z79.01 LONG TERM CURRENT USE OF ANTICOAGULANT: ICD-10-CM

## 2018-01-18 PROCEDURE — 85610 PROTHROMBIN TIME: CPT | Performed by: INTERNAL MEDICINE

## 2018-01-19 LAB
POC ANTICOAG MACHINE PASS CONTROL: YES
POC INR: 3 (ref 0.8–1.3)

## 2018-01-25 NOTE — PROGRESS NOTES
CARDIOLOGY VISIT NOTE    DATE OF VISIT:  09/23/2017 2NE-A CTU    HISTORY OF PRESENT ILLNESS: This is a 80-year-old who we were asked to see for acute on chronic diastolic heart failure and exacerbation of hypertension.      She has a history of diastolic 205/109. Pulse is 74 and irregular. Head is normocephalic. Eyes are nonicteric. Oral mucosa is moist. Neck: No carotid bruits or jugular venous distention. Thyroid is small. Back demonstrates kyphosis. Lungs have crackles in the bases.  Cardiac exam has an

## 2018-01-31 ENCOUNTER — NURSE ONLY (OUTPATIENT)
Dept: LAB | Age: 83
End: 2018-01-31
Attending: INTERNAL MEDICINE
Payer: MEDICARE

## 2018-01-31 DIAGNOSIS — I10 ESSENTIAL HYPERTENSION, MALIGNANT: Primary | ICD-10-CM

## 2018-01-31 DIAGNOSIS — J90 EXUDATIVE PLEURISY: ICD-10-CM

## 2018-01-31 LAB
BUN BLD-MCNC: 19 MG/DL (ref 8–20)
CALCIUM BLD-MCNC: 9 MG/DL (ref 8.3–10.3)
CHLORIDE: 99 MMOL/L (ref 101–111)
CO2: 31 MMOL/L (ref 22–32)
CREAT BLD-MCNC: 0.95 MG/DL (ref 0.55–1.02)
GLUCOSE BLD-MCNC: 125 MG/DL (ref 70–99)
POTASSIUM SERPL-SCNC: 4.1 MMOL/L (ref 3.6–5.1)
SODIUM SERPL-SCNC: 138 MMOL/L (ref 136–144)

## 2018-01-31 PROCEDURE — 80048 BASIC METABOLIC PNL TOTAL CA: CPT

## 2018-01-31 PROCEDURE — 36415 COLL VENOUS BLD VENIPUNCTURE: CPT

## 2018-02-07 ENCOUNTER — NURSE ONLY (OUTPATIENT)
Dept: LAB | Age: 83
End: 2018-02-07
Attending: INTERNAL MEDICINE
Payer: MEDICARE

## 2018-02-07 DIAGNOSIS — Z79.01 MONITORING FOR LONG-TERM ANTICOAGULANT USE: ICD-10-CM

## 2018-02-07 DIAGNOSIS — Z51.81 MONITORING FOR LONG-TERM ANTICOAGULANT USE: ICD-10-CM

## 2018-02-07 DIAGNOSIS — I48.20 CHRONIC ATRIAL FIBRILLATION (HCC): ICD-10-CM

## 2018-02-07 LAB
INR BLD: 3.14 (ref 0.89–1.11)
PSA SERPL DL<=0.01 NG/ML-MCNC: 33 SECONDS (ref 12–14.3)

## 2018-02-07 PROCEDURE — 85610 PROTHROMBIN TIME: CPT

## 2018-02-07 PROCEDURE — 36415 COLL VENOUS BLD VENIPUNCTURE: CPT

## 2018-02-15 ENCOUNTER — LAB REQUISITION (OUTPATIENT)
Dept: LAB | Facility: HOSPITAL | Age: 83
End: 2018-02-15
Attending: INTERNAL MEDICINE
Payer: MEDICARE

## 2018-02-15 DIAGNOSIS — Z51.81 ENCOUNTER FOR THERAPEUTIC DRUG LEVEL MONITORING: ICD-10-CM

## 2018-02-15 LAB
POC ANTICOAG MACHINE PASS CONTROL: YES
POC INR: 2 (ref 0.8–1.3)

## 2018-02-15 PROCEDURE — 85610 PROTHROMBIN TIME: CPT | Performed by: INTERNAL MEDICINE

## 2018-03-08 PROBLEM — I50.32 CHRONIC DIASTOLIC HEART FAILURE (HCC): Status: ACTIVE | Noted: 2017-09-15

## 2018-03-15 ENCOUNTER — LAB REQUISITION (OUTPATIENT)
Dept: LAB | Facility: HOSPITAL | Age: 83
End: 2018-03-15
Attending: INTERNAL MEDICINE
Payer: MEDICARE

## 2018-03-15 DIAGNOSIS — I48.91 ATRIAL FIBRILLATION (HCC): ICD-10-CM

## 2018-03-15 LAB
POC ANTICOAG MACHINE PASS CONTROL: YES
POC INR: 1.5 (ref 0.8–1.3)

## 2018-03-15 PROCEDURE — 85610 PROTHROMBIN TIME: CPT | Performed by: INTERNAL MEDICINE

## 2018-04-05 PROCEDURE — 85610 PROTHROMBIN TIME: CPT | Performed by: INTERNAL MEDICINE

## 2018-04-06 ENCOUNTER — LAB REQUISITION (OUTPATIENT)
Dept: LAB | Facility: HOSPITAL | Age: 83
End: 2018-04-06
Payer: MEDICARE

## 2018-04-06 DIAGNOSIS — Z79.01 LONG TERM CURRENT USE OF ANTICOAGULANT: ICD-10-CM

## 2018-04-19 ENCOUNTER — LAB REQUISITION (OUTPATIENT)
Dept: LAB | Facility: HOSPITAL | Age: 83
End: 2018-04-19
Payer: MEDICARE

## 2018-04-19 DIAGNOSIS — I48.20 CHRONIC ATRIAL FIBRILLATION (HCC): ICD-10-CM
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
## 2018-04-19 PROCEDURE — 85610 PROTHROMBIN TIME: CPT | Performed by: INTERNAL MEDICINE

## 2018-04-25 ENCOUNTER — NURSE ONLY (OUTPATIENT)
Dept: LAB | Age: 83
End: 2018-04-25
Attending: NURSE PRACTITIONER
Payer: MEDICARE

## 2018-04-25 DIAGNOSIS — I48.20 CHRONIC ATRIAL FIBRILLATION (HCC): ICD-10-CM

## 2018-04-25 DIAGNOSIS — I10 ESSENTIAL HYPERTENSION: ICD-10-CM

## 2018-04-25 DIAGNOSIS — I50.32 CHRONIC DIASTOLIC HEART FAILURE (HCC): ICD-10-CM

## 2018-04-25 PROCEDURE — 80048 BASIC METABOLIC PNL TOTAL CA: CPT

## 2018-04-25 PROCEDURE — 36415 COLL VENOUS BLD VENIPUNCTURE: CPT

## 2018-05-03 ENCOUNTER — LAB REQUISITION (OUTPATIENT)
Dept: LAB | Facility: HOSPITAL | Age: 83
End: 2018-05-03
Attending: INTERNAL MEDICINE
Payer: MEDICARE

## 2018-05-03 DIAGNOSIS — Z51.81 ENCOUNTER FOR THERAPEUTIC DRUG LEVEL MONITORING: ICD-10-CM

## 2018-05-03 DIAGNOSIS — Z79.01 LONG TERM CURRENT USE OF ANTICOAGULANT: ICD-10-CM

## 2018-05-03 DIAGNOSIS — I48.91 ATRIAL FIBRILLATION (HCC): ICD-10-CM

## 2018-05-03 PROCEDURE — 85610 PROTHROMBIN TIME: CPT | Performed by: INTERNAL MEDICINE

## 2018-07-25 NOTE — PROGRESS NOTES
Your lab results are normal.  No indication at this time of active infection. Continue your current management. Follow up in the office as needed and follow up with your PCP.

## 2018-11-19 NOTE — ED PROVIDER NOTES
Patient Seen in: [facility] Emergency Department    History   Patient presents with:  Fall (musculoskeletal, neurologic)  Upper Extremity Injury (musculoskeletal)    Stated Complaint: fall arm inj    HPI    [place] is a 40-year-old female presents (36.7 °C) (Temporal)   Resp 16   Ht 165.1 cm (5' 5\")   Wt 58.5 kg   LMP  (LMP Unknown)   SpO2 92%   BMI 21.47 kg/m²         Physical Exam    Alert and oriented patient appears no distress HEENT exam is normal lungs were clear cardiovascular exam shows reg

## 2018-11-19 NOTE — ED INITIAL ASSESSMENT (HPI)
Patient states she fell while getting into bed last night injuring her left upper arm. Patient denies any head injury.

## 2018-11-20 PROBLEM — J18.9 ACUTE PNEUMONIA: Status: ACTIVE | Noted: 2018-01-01

## 2018-11-21 PROBLEM — I50.9 ACUTE ON CHRONIC CONGESTIVE HEART FAILURE, UNSPECIFIED HEART FAILURE TYPE (HCC): Status: ACTIVE | Noted: 2018-01-01

## 2018-11-21 NOTE — CM/SW NOTE
11/21/18 1400   CM/SW Referral Data   Referral Source Social Work (self-referral)   Reason for Referral Discharge planning   Informant Patient; Children   Patient Info   Patient's Mental Status Alert;Oriented   Patient's Home Environment Supportive Chava

## 2018-11-21 NOTE — ED NOTES
Discussed fluid resuscitation per sepsis protocol with Dr Samm Gaona. Per MD, no additional fluids given due to high BNP and CHF exacerbation. Will continue to monitor.

## 2018-11-21 NOTE — CONSULTS
120 [facility] Dosing Service  Warfarin (Coumadin) Initial Dosing    Sydnee Mata is a 80year old female for whom pharmacy has been consulted to dose warfarin (COUMADIN) for Prophylaxis of venous thrombosis by Dr. Brandi Winter.   Based on this indication, go

## 2018-11-21 NOTE — CONSULTS
[facility]  Report of Consultation    Art Ordaz Patient Status:  Inpatient    1924 MRN [id number]   [facility] 7NE-A Attending Ankit Painting MD   Hosp Day # 0 PCP Gordon Guerra MD     Reason for Consultation:  [facility] c/o GATES. I had her resume HCTZ. Edema improved but SOB persisted. I had her wear Holter to see if GATES occurred with rapid afib but she had rate-controlled afib during the entire monitoring period. Echo EF 70%, moderate Mitral regurgitation/TR.   At some SURGICAL HISTORY      bladder sling   • OTHER SURGICAL HISTORY  years after hyst    cystocele repair     Family History   Problem Relation Age of Onset   • Breast Cancer Sister         diagnosed in her 66's,  age 78   • Breast Cancer Other Av °F (36.7 °C), Min:97.6 °F (36.4 °C), Max:98.3 °F (36.8 °C)        General: Alert and oriented in no apparent distress. HEENT: No focal deficits. NC/AT, EOMI, sclera anicteric, mucous membranes moist  Neck: No thyromegaly, lymphadenopathy or bruits.

## 2018-11-21 NOTE — CONSULTS
Heart Failure Navigator Progress Note    Attempt made by the Heart Failure Navigator to evaluate [place] for understanding, verbalization, demonstration, and recall of education related to heart failure, overall adherence to the [facility]

## 2018-11-21 NOTE — ED PROVIDER NOTES
Patient Seen in: [facility] Emergency Department    History   Patient presents with:  Dyspnea NORMAN SOB (respiratory)    Stated Complaint: NORMAN    HPI    Elderly female in the emergency department yesterday, went back to nursing home and brought back t week    Drug use: No      Review of Systems    Positive for stated complaint: NORMAN  Other systems are as noted in HPI. Constitutional and vital signs reviewed. All other systems reviewed and negative except as noted above.     Physical Exam     ED [employer] 52 (*)     All other components within normal limits   PRO BETA NATRIURETIC PEPTIDE - Abnormal; Notable for the following components:    Pro-Beta Natriuretic Peptide 8,422 (*)     All other components within normal limits   LACTIC ACID, PLASMA - A on EKG Report. No acute ST Elevation or Depression, noisy baseline, no acute ST elevation or depression                    Patient does not appear septic, with normal heart rate and vital signs, no fever.   Patient is not given the sepsis fluid bolus parris Hyperexpansion of the lungs.  3.  Probable small bilateral pleural effusions     Dictated by: Carlos Eduardo Velasquez MD on 11/20/2018 at 22:28     Approved by: Carlos Eduardo Velasquez MD            Xr Chest Ap Portable  (cpt=71045)    Result Date: 11/19/2018  PROCEDURE: Date Reviewed: 7/19/2018          ICD-10-CM Noted POA    Acute pneumonia J18.9 11/20/2018 Unknown

## 2018-11-21 NOTE — PROGRESS NOTES
NURSING ADMISSION NOTE      Patient admitted via cart at 0000  Oriented to room. Safety precautions initiated. Bed in low position. Call light in reach.

## 2018-11-21 NOTE — CONSULTS
[address] Patient Status:  Inpatient    1924 MRN [id number]   [facility] 7NE-A Attending Vasu Forte MD   [facility] Day # 0 PCP Leora Starkey MD     Date of Admission: 2018  9:33 PM  Admission Diagn No Known Allergies     Social History:   reports that  has never smoked. she has never used smokeless tobacco. She reports that she does not drink alcohol or use drugs.       Family History:  Family History   Problem Relation Age of Onset   • Breast Ca DilTIAZem HCl ER Coated Beads (CARTIA XT) 180 MG Oral Capsule SR 24 Hr Take 180 mg by mouth daily. Disp:  Rfl:    Olmesartan Medoxomil 40 MG Oral Tab Take 40 mg by mouth daily.  Disp:  Rfl:    docusate sodium 100 MG Oral Cap Take 100 mg by mouth 2 (two) t diarrhea, constipation, melena, abdominal pain  : denies hematuria, dysuria, hesitancy, or incontinence  Musculoskeletal: denies arthralgias, myalgias, muscle weakness, or joint swelling  Skin: denies rash or pruritis; no jaundice  Neurologic: denies num trace edema                          Skin: No rashes or lesions.     Recent Labs   Lab  11/20/18   2141  11/21/18   0350   GLU  182*  142*   BUN  13  14   CREATSERUM  0.94  0.74   GFRAA  60  80   GFRNAA  52*  70   CA  8.9  8.1*   NA  135*  138   K  3.9  3.7

## 2018-11-21 NOTE — PLAN OF CARE
Assumed care of pt @ 2030. Pt is A/Ox4, VSS. On 6L of O2,  Droplet precautions initiated for r/o flu. D. Dimer elevated, CTA ordered by hospitalist, cards made aware, awaiting results, endorsed to oncoming RN.  Crackles/rhonchi heard bilaterally, PRNs order

## 2018-11-21 NOTE — ED INITIAL ASSESSMENT (HPI)
Pt to ED via EMS for shortness of breath. Pt was in ER yesterday and diagnosed with pneumonia. Pt is c/o having increased difficulty breathing throughout the day.

## 2018-11-21 NOTE — H&P
[facility] Hospitalist H&P       CC: Patient presents with:  Dyspnea NORMAN SOB (respiratory)       PCP: La Gonzalez MD    History of Present Illness:  Ms. Ruth Ann Gerardo is a 81 yo female with PMH of afib, CHF, HTN, HLD who presented to the ED with shortness of breath. REFILLS Disp: 90 capsule Rfl: 0   OLMESARTAN MEDOXOMIL 40 MG Oral Tab TAKE 1 TABLET BY MOUTH EVERY DAY Disp: 90 tablet Rfl: 3   TRAZODONE HCL 50 MG Oral Tab TAKE 2 TABLETS BY MOUTH EVERY DAY AT BEDTIME Disp: 180 tablet Rfl: 3   PAROXETINE HCL 10 MG Oral Ta 27   • Other (MVA) Father          age 45       Review of Systems  12 point ROS negative, except as stated in HPI    OBJECTIVE:  /47 (BP Location: Right arm)   Pulse 65   Temp 97.6 °F (36.4 °C) (Oral)   Resp 20   Ht 165.1 cm (5' 5\")   Wt 129 11/19/2018  PROCEDURE:  XR HUMERUS (MIN 2 VIEWS), LEFT (CPT=73060)  INDICATIONS:  fall arm inj  COMPARISON:  None.   PATIENT STATED HISTORY: (As transcribed by Technologist)  Patient states that she fell last night and has pain mid-lower shaft of her left h inj  PATIENT STATED HISTORY: (As transcribed by Technologist)  Patient states a productive cough \"for some time\". FINDINGS:  Severe dextroscoliosis of the thoracic lumbar spine. The lungs are hyperinflated. There is cardiomegaly.   Airspace opacities PT    Dispo: admitted for SOB    Outpatient records reviewed confirming patient's medical history and medications.      Paxton Ortiz  Internal Medicine  [facility] Hospitalist  Pager: [phone]      **Certification      PHYSICIAN Certification of Need for Inp

## 2018-11-21 NOTE — PHYSICAL THERAPY NOTE
PHYSICAL THERAPY EVALUATION - INPATIENT     Room Number: 4392/7481-F  Evaluation Date: 11/21/2018  Type of Evaluation: Initial  Physician Order: PT Eval and Treat    Presenting Problem: PNA, fall  Reason for Therapy: Mobility Dysfunction and Discharg cystocele repair       HOME SITUATION  Type of Home: Independent living facility   Home Layout: Two level  Stairs to Enter : 0     Stairs to Bedroom: 0       Lives With: Alone     Patient Owned Equipment: Rolling walker; Other (Comment)(W/C)       Prior Orlando Chance have...  -   Turning over in bed (including adjusting bedclothes, sheets and blankets)?: A Little   -   Sitting down on and standing up from a chair with arms (e.g., wheelchair, bedside commode, etc.): A Little   -   Moving from lying on back to sitting on female admitted on 11/20/2018 for PNA, fall. Pertinent comorbidities and personal factors impacting therapy include A-fib, CHF, HTN, HLD.   In this PT evaluation, the patient presents with the following impairments decreased strength, decreased balance, ga #6    Goal Comments: Goals established on 11/21/2018

## 2018-11-21 NOTE — PROGRESS NOTES
Pharmacy Note: Renal dose adjustment for Metoclopramide (Reglan)  Alex Michael has been prescribed Metoclopramide (Reglan) 10 mg every 8 hours as needed. Estimated Creatinine Clearance: 32.9 mL/min (based on SCr of 0.94 mg/dL).     Her calculated cre

## 2018-11-22 NOTE — PROGRESS NOTES
[facility] Hospitalist Progress Note     [place] Patient Status:  Inpatient    1924 MRN [id number]   [facility] 7NE-A Attending Dharmesh Manjarrez MD   Hosp Day # 1 PCP Elicia Bamberger, MD     CC: follow up    SUBJECTIVE:  Resting the lower lobe with a large amount of fluid and food material within the mid upper esophagus. Findings suggest aspiration pneumonia. 3.  Patchy infiltrate within the left lower lobe and posterior segment of the right upper lobe and right middle lobe.  4. reactive  - Monitor     DVT Prophy: INR supratherapeutic   Deconditioning prevention: PT     Dispo: remain inpatient      Outpatient records reviewed confirming patient's medical history and medications.      Keri Allen MD

## 2018-11-22 NOTE — CONSULTS
120 [facility] Dosing Service  Warfarin (Coumadin) Subsequent Dosing    Miryam Sanchez is a 80year old female for whom pharmacy has been dosing warfarin (Coumadin).  Goal INR is 2-3    Recent Labs   Lab  11/20/18   2141  11/21/18   7610  11/22/18   7987

## 2018-11-22 NOTE — PROGRESS NOTES
[facility]  Cardiology Progress Note    Mauricio Batres Patient Status:  Inpatient    1924 MRN [id number]   [facility] 7NE-A Attending Guilherme Brown MD   [facility] Day # 1 PCP Nancy Moncada MD     Assessment:  Aspiration pneum edema.   Neurologic: Alert and oriented,  Gross motor and sensory modalities preserved  Psychiatric: Normal mood and affect  Skin: Warm and dry, no obvious rashes     MEDICATIONS:  • azithromycin  500 mg Intravenous Q24H   • cefTRIAXone  1 g Intravenous Q24

## 2018-11-22 NOTE — PROGRESS NOTES
[address] Patient Status:  Inpatient    1924 MRN [id number]   [facility] 7NE-A Attending Pao Phipps MD   Hosp Day # 1 PCP Pete Redmond MD     SUBJECTIVE  Denies SOB, wants to eat.      OBJECTIVE:  B Exam:                          General: alert, cooperative, in NAD                          HEENT: oropharynx clear without erythema or exudates, moist mucous membranes                          Lungs: Clear to auscultation bilaterally, no wheezes or crackl moderately increased to 45mmHg  2. Pneumonia: CTA imaging c/w aspiration PNA given dilated esophagus with food material present.   procal elevated to 1.23  -RVP negative   -sputum culture  -urine strep/legionella  -will transition Abx to zosyn given concern

## 2018-11-22 NOTE — PLAN OF CARE
Assumed care of pt 1930. Ox4. Drowsy. 5L02, sat's 96%. Lungs dim bilat with crackles. Nonproductive cough. Dr Meet Pacheco notified of CT of chest results. Order to make pt NPO, ST to see. Order for GI consult. Dr Delmer Yao notified of new consult.  MD to see in am.

## 2018-11-22 NOTE — PLAN OF CARE
Assumed care @5200. AOx4, drowsy. NPO-awaiting speech eval.   A.fib, rate controlled on tele. Oriented to room and call light. Updated on POC. Will continue to monitor.      CARDIOVASCULAR - ADULT    • Maintains optimal cardiac output and hemodyna

## 2018-11-22 NOTE — CONSULTS
[address] Patient Status:  Inpatient    1924 MRN [id number]   [facility] 7NE-A Attending Leatha Melgar MD   [facility] Day # 1 PCP MD Naresh Bonilla is a 80year old female for abnormal GI HX: None, no hx of colon cancer  Family History   Problem Relation Age of Onset   • Breast Cancer Sister         diagnosed in her 66's,  age 78   • Breast Cancer Other         maternal aunt diagnosed in her 29's   • Other (TB) Mother         dec

## 2018-11-23 PROBLEM — Z71.89 GOALS OF CARE, COUNSELING/DISCUSSION: Status: ACTIVE | Noted: 2018-01-01

## 2018-11-23 PROBLEM — Z51.5 PALLIATIVE CARE ENCOUNTER: Status: ACTIVE | Noted: 2018-01-01

## 2018-11-23 NOTE — SLP NOTE
ADULT SWALLOWING EVALUATION    ASSESSMENT    ASSESSMENT/OVERALL IMPRESSION:  Order received for bedside swallow evaluation. Pt is an 79 y/o female who presented from NH with increasing SOB; recent visit to ER with dx of PNA.  CXR revealed bilateral lower lo Precautions: (n/a)  Medication Administration Recommendations: Whole in puree  Treatment Plan/Recommendations: Videofluoroscopic swallow study  Discharge Recommendations/Plan: [address] [facility]  Reason for Referral: R/O Limits  Tone: Within Functional Limits  Range of Motion: Within Functional Limits  Rate of Motion: Within Functional Limits    Voice Quality: Clear  Respiratory Status: Supplemental O2  Consistencies Trialed: Thin liquids; Nectar thick liquids;Puree  Method

## 2018-11-23 NOTE — CM/SW NOTE
CM order noted. Met with patient and DIL. DIL states the rest of the family is currently having a meeting with palliative care. Message left for Joanne Martinez (palliative care APN) regarding how to proceed with the care conference order.

## 2018-11-23 NOTE — PLAN OF CARE
Assumed care of patient at 1900. Alert and oriented x4. No complaints of pain. AF on tele. 2 liters per nasal cannula, lung sounds diminished with crackles. Non-productive cough. Briefed and incontinent of urine at times. Up with standby with walker.  IV ab

## 2018-11-23 NOTE — PROGRESS NOTES
Pulmonary Progress Note        NAME: [address]: 9423/8440-A - MRN: [id number] - Age: 80year old - : 1924        Last 24hrs: No events overnight, feels ok this afternoon    OBJECTIVE:   18  0100 18  0500 18  0855  11/23/18   0551   NA  135*  138  138  138   K  3.9  3.7  4.5  4.5  3.6   CL  98*  102  103  103   CO2  27.0  28.0  29.0  30.0   BUN  13  14  22*  16   CA  8.9  8.1*  8.4  8.4   MG   --   1.7*  2.1   --        No results for input(s): ALT, AST, ALB, AMYLASE Pulmonary and Critical Care

## 2018-11-23 NOTE — PLAN OF CARE
Problem: Impaired Swallowing  Goal: Minimize aspiration risk    NPO with vfss recommended  Outcome: Progressing

## 2018-11-23 NOTE — PLAN OF CARE
Problem: Impaired Swallowing  Goal: Minimize aspiration risk    NPO with vfss recommended   Outcome: Progressing  Video swallow study revealed no aspiration.  Rec; 1) reg/thin 2) dysphagia tx 3) see full report for further results and recs

## 2018-11-23 NOTE — PROGRESS NOTES
[facility]  Progress Note    [place] Patient Status:  Inpatient    1924 MRN [id number]   [facility] 7NE-A Attending Yelena Vasquez MD   [facility] Day # 2 PCP Calin Lebron MD     Subjective:  [place] is a(n) [age]year old female. Pt with pneumonia. Abn esophagus on CT but completely asymptomatic. INR better      Plan:    1. Await swallow study. 2.  If passes, advance diet.   3.  Can plan elective esophageal eval depending on clinical course    Munira Degroot

## 2018-11-23 NOTE — PROGRESS NOTES
[facility] Hospitalist Progress Note     [place] Patient Status:  Inpatient    1924 MRN [id number]   [facility] 7NE-A Attending Mateo Christopher MD   Hosp Day # 2 PCP Irving Lutz MD     CC: follow up    SUBJECTIVE:  Feels hu Intravenous Daily   • piperacillin-tazobactam  3.375 g Intravenous Q8H   • PARoxetine HCl  10 mg Oral Nightly   • DilTIAZem HCl ER Coated Beads  180 mg Oral Daily   • GuaiFENesin ER  600 mg Oral BID     Continuous Infusing Medication:  PRN Medication:aceta

## 2018-11-23 NOTE — SLP NOTE
ADULT VIDEOFLUOROSCOPIC SWALLOWING STUDY    Admission Date: 11/20/2018  Evaluation Date: 11/23/18  Radiologist: Dr. Gallegos Dowse: Regular; Soft diet(when cleared by GI)  Diet Recommendations - Liquid:  Thin\  Altern mid upper esophagus. Findings suggest aspiration pneumonia. 3.  Patchy infiltrate within the left lower lobe and posterior segment of the right upper lobe and right middle lobe.   4.  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 Results of exam revealed a mild pharyngeal dysphagia characterized by mild delay in pharyngeal response to valleculae with thin liquids; timely pharyngeal response with pureed and cracker.  Mild-mild pharyngeal residu

## 2018-11-23 NOTE — PROGRESS NOTES
11/23/18 1230   Clinical Encounter Type   Referral From Nurse   Referral To ([address] Marc Pineda as per the consult received)

## 2018-11-23 NOTE — PROGRESS NOTES
11/23/18 1426   Clinical Encounter Type   Visited With Patient and family together   Sacramental Encounters   Sacrament of Sick-Anointing Visiting  anointed patient   The patient was seen by Xi Castano.  Received prayer, Scripture, sanches

## 2018-11-23 NOTE — CONSULTS
ANKITA/ Marysol De [address] 30  [id number]  Hospital Day #2  Date of Consult: 11/23/18  Patient seen at:  Other([facility])    Reason for Consultation: Consult requested for evaluation of palliative care Facility-Administered Medications:  Warfarin Sodium (COUMADIN) tab 2 mg 2 mg Oral Once at night   potassium chloride 40 mEq in sodium chloride 0.9 % 250 mL IVPB 40 mEq Intravenous Once   furosemide (LASIX) injection 20 mg 20 mg Intravenous Daily   Shruthi patients current wishes. We discussed the importance of advance care planning prior to crisis. I provided education on the differences between palliative care and hospice care.  The family acknowledged that they were providing \"palliative care\" to her to was spent counseling and coordinating care. Thank you for allowing the Palliative Care Team to participate in the care of your patient. We will continue to follow.     PRIYANKA Pickens  Palliative Care Nurse Practitioner  Pager [phone], Phone [phone]  11/23/2

## 2018-11-23 NOTE — CONSULTS
Palliative Care Consult request from Dr. Ramila Lee noted for goals of care. Discussed consultation request with patient's son, Rosalia Kowalski  ([phone]) via phone today.  Family meeting with Rosalia Kowalski and his 2 sisters Mckenzie Fragoso and Robert Wilberto planned for today at [phone] to me

## 2018-11-24 NOTE — PROGRESS NOTES
[facility] Hospitalist Progress Note     [place] Patient Status:  Inpatient    1924 MRN [id number]   [facility] 7NE-A Attending Soumya Abraham MD   [facility] Day # 3 PCP Jamison Wang MD     CC: follow up    SUBJECTIVE:  No acute Sodium  2 mg Oral Once at night   • metoprolol Tartrate  25 mg Oral 2x Daily(Beta Blocker)   • furosemide  20 mg Intravenous Daily   • piperacillin-tazobactam  3.375 g Intravenous Q8H   • PARoxetine HCl  10 mg Oral Nightly   • DilTIAZem HCl ER Coated Beads

## 2018-11-24 NOTE — PROGRESS NOTES
Pulmonary Progress Note        NAME: [place] - ROOM: 2109/4401-P - MRN: [id number] - Age: 80year old - : 1924    Past Medical History:   Diagnosis Date   • Arrhythmia    • ATRIAL FIBRILLATION    • Cataract    • Congestive heart disease (HC 0350  11/22/18   0523  11/24/18   0228   RBC  4.81  4.13  3.80  3.82   HGB  14.3  12.0  11.2*  11.4*   HCT  42.8  37.1  34.4  34.3   MCV  89.0  89.8  90.5  89.8   MCH  29.7  29.1  29.5  29.8   MCHC  33.4  32.3  32.6  33.2   RDW  15.6  15.5  16.0  15.7   NE

## 2018-11-24 NOTE — PLAN OF CARE
Received pt a/ox4, 2L NC at 0700  Bilateral lung sounds are diminished with crackles, non productive cough  Pt receiving IV Abx  Pt Afib on tele with HR in 80's  Pt up standby and walker, bed alarm in place, call light within reach  Pt updated on plan of c

## 2018-11-24 NOTE — PROGRESS NOTES
[facility]  Cardiology Progress Note    Thao Figueroa Patient Status:  Inpatient    1924 MRN [id number]   [facility] 7NE-A Attending Clif Bucio MD   [facility] Day # 3 PCP Rikki Bradley MD     Assessment:  Aspiration pneum expansion  Abdomen: Soft, thin, non-tender, non-distended. No hepatosplenomegaly, masses  Extremities:  Peripheral pulses are  2+, no edema.   Neurologic: Alert and oriented,  Gross motor and sensory modalities preserved  Psychiatric: Normal mood and affec

## 2018-11-24 NOTE — PLAN OF CARE
Assumed care at 1900  Alert and oriented x4  Pleasant and cooperative  Resting comfortably in bed, no s/s distress  Bilateral lungs crackle/diminished  Reinforced education on IS  Plan is for IV abx and possible outpt egd      CARDIOVASCULAR - ADULT    • M

## 2018-11-24 NOTE — PROGRESS NOTES
[facility]  Progress Note    [place] Patient Status:  Inpatient    1924 MRN [id number]   [facility] 7NE-A Attending Yelena Vasquez MD   [facility] Day # 3 PCP Calin Lebron MD     Subjective:  [place] is a(n) 68 heart failure, unspecified heart failure type [facility])     Palliative care encounter     Goals of care, counseling/discussion       [place] is a(n) 80year old female. Pt with aspiration had food in esophagus on CT.   Completely asymptomatic from dysp

## 2018-11-24 NOTE — CONSULTS
[address] [facility] Dosing Service  Warfarin (Coumadin) Subsequent Dosing    Otilia Mason is a 80year old female with a history of afib for whom pharmacy has been dosing warfarin (Coumadin).  Goal INR is 2-3    Recent Labs   Lab  11/20/18 2141  11/21/18   0

## 2018-11-25 NOTE — PLAN OF CARE
Assumed care at 1900  Alert and oriented x4, forgetful at times  Pleasant and cooperative  Lungs diminished, no cough noted  IV abx infusing  Plan is for d/c tom or monday    CARDIOVASCULAR - ADULT    • Maintains optimal cardiac output and hemodynamic st

## 2018-11-25 NOTE — PROGRESS NOTES
[facility]  Cardiology Progress Note    Dearstalin De Paz Patient Status:  Inpatient    1924 MRN [id number]   [facility] 7NE-A Attending Elham Bejarano MD   [facility] Day # 4 PCP La Gonzalez MD     Assessment:  Aspiration pneum irreg, normal S1 S2, 2/6 syst murmurs/gallops  Lungs: No wheezes  good chest wall expansion  Abdomen: Soft, thin, non-tender, non-distended. No hepatosplenomegaly, masses  Extremities:  Peripheral pulses are  2+, no edema.   Neurologic: Alert and oriented

## 2018-11-25 NOTE — CONSULTS
[address] [facility] Dosing Service  Warfarin (Coumadin) Subsequent Dosing    Dallin Soto is a 80year old female with a history of afib for whom pharmacy has been dosing warfarin (Coumadin).  Goal INR is 2-3    Recent Labs   Lab  11/21/18   0923  11/22/18   0

## 2018-11-25 NOTE — PROGRESS NOTES
[facility] Hospitalist Progress Note     [place] Patient Status:  Inpatient    1924 MRN [id number]   [facility] 7NE-A Attending Mitzy Tyson MD   [facility] Day # 4 PCP Kendal Green MD     CC: follow up    SUBJECTIVE:  Sitting Coated Beads  180 mg Oral Daily   • GuaiFENesin ER  600 mg Oral BID     Continuous Infusing Medication:  PRN Medication:acetaminophen, PEG 3350, magnesium hydroxide, bisacodyl, FLEET ENEMA, ondansetron HCl, Metoclopramide HCl, ipratropium-albuterol, benzon

## 2018-11-26 NOTE — CM/SW NOTE
Per note from [facility] palliative care RN, she will continue with Yuval Solomon discussion today with pt and family.

## 2018-11-26 NOTE — PHYSICAL THERAPY NOTE
PHYSICAL THERAPY TREATMENT NOTE - INPATIENT    Room Number: 3969/8913-T     Session: 1   Number of Visits to Meet Established Goals: 5    Presenting Problem: PNA, fall     History related to current admission: Pt is 80year old female admitted on 11/20/20 SUBJECTIVE  \"I am ready to go home\"    Patient’s self-stated goal is go home    OBJECTIVE  Precautions: Cardiac;Bed/chair alarm; Low vision    WEIGHT BEARING RESTRICTION  Weight Bearing Restriction: None                PAIN ASSESSMENT   Rating: Unab pt to smaller unit at [address] and pay for additional support for ADLs and have RN available 24/7 (sounds like AL). Pt performed supine to sit independently.  Pt performed sit to stand Mod I. Pt ambulated within hallway with RW, supervision, de training;Strengthening  Rehab Potential : Good  Frequency (Obs): 5x/week    CURRENT GOALS     Goal #1 Patient is able to demonstrate supine - sit EOB @ level: supervision  Met for Mod I      Goal #2 Patient is able to demonstrate transfers Sit to/from [place]

## 2018-11-26 NOTE — PLAN OF CARE
Assumed care at 1900  Resting comfortably in bed, states \"I feel like I've always felt\"  Medication given as ordered  Alert and oriented  Call light in reach  Plan of care discussed  Anticipate PT eval tomorrow      CARDIOVASCULAR - ADULT    • Maintains

## 2018-11-26 NOTE — PLAN OF CARE
Problem: Impaired Swallowing  Goal: Minimize aspiration risk    NPO with vfss recommended   Outcome: Completed Date Met: 11/26/18

## 2018-11-26 NOTE — DISCHARGE SUMMARY
General Medicine Discharge Summary     Patient ID:  Kadi Collado  80year old  1/17/1924    Admit date: 11/20/2018    Discharge date and time: 11/27/2018    Attending Physician: James Hendrickson, improving           Consults: IP CONSULT TO HOSPITALIST  IP CONSULT TO SOCIAL WORK  IP CONSULT TO RESPIRATORY CARE  IP CONSULT TO PULMONOLOGY  IP CONSULT TO PHARMACY  IP CONSULT TO CARDIOLOGY  IP CONSULT TO HEART FAILURE COORDINATOR  IP CONSULT TO FOOD AND with provider.       STOP taking these medications    Amoxicillin-Pot Clavulanate 875-125 MG Oral Tab    Multiple Vitamins-Minerals (PRESERVISION AREDS) Oral Tab          Activity: activity as tolerated  Diet: cardiac  Wound Care: as directed  Code Status:

## 2018-11-26 NOTE — PALLIATIVE CARE NOTE
[address] Follow Up      Kaylynn Noonan  [id number]  Patient seen at: [facility])    Patient seen and evaluated, family Daughter Lori Maldonado at bedside.      ROS: Denies complaints    Physical Exam:  Vital Signs: /68 ( was spent counseling and coordinating care. Palliative will follow peripherally pending family decision/notification to initiate home palliative care at [place].      PRIYANKA Hernandez  Palliative Care Nurse Practitioner  Pager [phone], Phone [phone]  11/26/2018

## 2018-11-26 NOTE — SLP NOTE
SPEECH DAILY NOTE - INPATIENT    ASSESSMENT & PLAN   ASSESSMENT  Pt seen for dysphagia tx to assess tolerance with recommended diet, ensure appropriate utilization of aspiration precautions and provide pt/family education.  Video swallow study completed 11/ compensatory strategies as outlined by  VFSS including Slow rate, Small bites, Small sips, Alternate liquids/solids, Upright 90 degrees 30 mins after meal, Supervision with meals with mild assistance 95 % of the time across 2 sessions.               FOLLOW

## 2018-11-26 NOTE — PROGRESS NOTES
[facility]  Cardiology Progress Note    Thao Figueroa Patient Status:  Inpatient    1924 MRN [id number]   [facility] 7NE-A Attending Jose Eduardo Aguilar MD   [facility] Day # 5 PCP Rikki Bradley MD     Assessment:  Aspiration pneu rashes     MEDICATIONS:  • potassium chloride  40 mEq Oral Q4H   • Warfarin Sodium  4 mg Oral Once at night   • metoprolol Tartrate  25 mg Oral 2x Daily(Beta Blocker)   • furosemide  20 mg Intravenous Daily   • PARoxetine HCl  10 mg Oral Nightly   • DilTIA

## 2018-11-26 NOTE — PROGRESS NOTES
[address] [facility] Dosing Service  Warfarin (Coumadin) Subsequent Dosing    Sara Valadez is a 80year old female for whom pharmacy has been dosing warfarin (Coumadin). Goal INR is 2-3.     Recent Labs   Lab  11/22/18   8847  11/23/18   0551  11/24/18   0227

## 2018-11-26 NOTE — CM/SW NOTE
CM met with patient and Jian Dupree (daughter) to discuss discharge plan. Plan is to return to [facility] independent living w/ care assistance of three daughter's, then transition to assisted living at [facility].  Home healthcare services offer

## 2018-11-26 NOTE — DIETARY NOTE
Nutrition Short Note    Dietitian consult received per heart failure standing order. Discussed sodium and fluid guidelines, foods to avoid, seasoning without salt, food labels and eating out appropriately. Pt receptive.  Pt verbalizes understanding and has n

## 2018-11-26 NOTE — PLAN OF CARE
O2 walk:      SPO2% on ROOM AIR: 89  SPO2% ambulation on Room AIR: 84  SPO2%ambulation on O2: 92 On 2 L/min

## 2018-11-27 NOTE — HOME CARE RN NOTE
Referral received for home health from discharge rounds. I met with patient and daughter and bedside. Per daughter she will not participate in physical therapy and they are declining home health at this time. Brochure and my card provided.

## 2018-11-27 NOTE — CM/SW NOTE
11/27/18 1400   Discharge disposition   Expected discharge disposition [facility]   Name of [place] [phone] services after discharge Patient refused services   E provider Jewell Gamino   Discharge transportation Private

## 2018-11-27 NOTE — PLAN OF CARE
A/O x4 can be forgetful. On 2L O2. O2 walk desats to 84% on ambulation on RA. afib per tele. INR 1.57   C/o arm pain, tylenol given w/ adequate relief.  k replaced per protocol, gave oral solution d/t not tolerating pill forms.    IV lasix changed to PO

## 2018-11-27 NOTE — PHYSICAL THERAPY NOTE
Attempted to see Pt this PM - RN aware of attempt. Pt currently being d/c back to [facility] with caregivers. Per chart - Pt refused HHPT.   Will f/u later today if time permits, after all other patients are attempted per tentative schedu

## 2018-11-27 NOTE — CM/SW NOTE
Pt is ready for d/c today. Pt will go back to [facility] and transfer into assisted living - she declined HH. Pt will get an 02 tank delivered today, once she is home, from [place]. She did not need a tank for transport.   Dtr to drive her ranjith

## 2018-11-27 NOTE — PROGRESS NOTES
[address] [facility] Dosing Service  Warfarin (Coumadin) Subsequent Dosing    Michael Long is a 80year old female for whom pharmacy has been dosing warfarin (Coumadin).  Goal INR is 2-3    Recent Labs   Lab  11/23/18   0551  11/24/18   0228  11/25/18   0534  1

## 2018-11-27 NOTE — PLAN OF CARE
Assumed care at 0000. AOx4. Denies any pain or discomfort. Continue on O2 2L nasal cannula. O2 sat mid 90%. Up with assistance with her on walker. Ambulates with steady gait. Plan to discharge back to [facility]. Plan of care discussed with pt.    Be

## 2018-11-27 NOTE — PROGRESS NOTES
NURSING DISCHARGE NOTE    Discharged Home via Wheelchair. Accompanied by Family member  Belongings Taken by patient/family.     Elizabeth to deliver tank to home this evening

## 2018-11-27 NOTE — PROGRESS NOTES
Home Oxygen Evaluation     11/26/18 1600   Mobility   O2 walk?  Yes   SPO2 on Room Air at Rest 89   SPO2 Ambulation on Room Air 84   SPO2 Ambulation on Oxygen 92   Ambulation oxygen flow (liters per minute) 2

## 2018-11-27 NOTE — PLAN OF CARE
Assumed care at 1900  Resting in bed comfortably, denies pain  Alert and oriented x4, forgetful at times  SpO2 @94% on 2L per NC  Lungs clear and diminished bilaterally  Weak productive cough with scant amount of clear thin sputum noted  States, \"Going ho

## 2018-11-27 NOTE — PROGRESS NOTES
[facility] Hospitalist Progress Note     [place] Patient Status:  Inpatient    1924 MRN [id number]   [facility] 7NE-A Attending Ara Shaw MD   [facility] Day # 6 PCP Jerzy Bateman MD     CC: follow up    SUBJECTIVE:    Breath Warfarin Sodium  4 mg Oral Once at night   • furosemide  40 mg Oral Daily   • metoprolol Tartrate  25 mg Oral 2x Daily(Beta Blocker)   • PARoxetine HCl  10 mg Oral Nightly   • DilTIAZem HCl ER Coated Beads  180 mg Oral Daily   • GuaiFENesin ER  600 mg Oral

## 2018-12-03 PROBLEM — I50.9 ACUTE ON CHRONIC CONGESTIVE HEART FAILURE, UNSPECIFIED HEART FAILURE TYPE (HCC): Status: RESOLVED | Noted: 2018-01-01 | Resolved: 2018-01-01

## 2018-12-03 PROBLEM — I50.9 ACUTE ON CHRONIC CONGESTIVE HEART FAILURE, UNSPECIFIED CONGESTIVE HEART FAILURE TYPE: Status: RESOLVED | Noted: 2017-01-13 | Resolved: 2018-01-01

## 2018-12-03 PROBLEM — I50.9 ACUTE ON CHRONIC CONGESTIVE HEART FAILURE (HCC): Status: RESOLVED | Noted: 2017-01-13 | Resolved: 2018-01-01

## 2018-12-05 PROBLEM — R73.9 HYPERGLYCEMIA: Status: RESOLVED | Noted: 2017-04-16 | Resolved: 2018-01-01

## 2018-12-05 PROBLEM — K57.92 ACUTE DIVERTICULITIS: Status: RESOLVED | Noted: 2017-03-25 | Resolved: 2018-01-01

## 2019-01-01 ENCOUNTER — APPOINTMENT (OUTPATIENT)
Dept: GENERAL RADIOLOGY | Facility: HOSPITAL | Age: 84
DRG: 177 | End: 2019-01-01
Attending: EMERGENCY MEDICINE
Payer: MEDICARE

## 2019-01-01 ENCOUNTER — NURSE ONLY (OUTPATIENT)
Dept: LAB | Age: 84
End: 2019-01-01
Attending: INTERNAL MEDICINE
Payer: MEDICARE

## 2019-01-01 ENCOUNTER — HOSPITAL ENCOUNTER (INPATIENT)
Facility: HOSPITAL | Age: 84
LOS: 5 days | Discharge: HOSPICE | DRG: 177 | End: 2019-01-01
Attending: EMERGENCY MEDICINE | Admitting: INTERNAL MEDICINE
Payer: MEDICARE

## 2019-01-01 ENCOUNTER — APPOINTMENT (OUTPATIENT)
Dept: GENERAL RADIOLOGY | Facility: HOSPITAL | Age: 84
DRG: 177 | End: 2019-01-01
Attending: INTERNAL MEDICINE
Payer: MEDICARE

## 2019-01-01 VITALS
RESPIRATION RATE: 16 BRPM | SYSTOLIC BLOOD PRESSURE: 123 MMHG | DIASTOLIC BLOOD PRESSURE: 38 MMHG | OXYGEN SATURATION: 100 % | WEIGHT: 129.19 LBS | TEMPERATURE: 98 F | BODY MASS INDEX: 22 KG/M2 | HEART RATE: 53 BPM

## 2019-01-01 DIAGNOSIS — M85.80 OSTEOPENIA: ICD-10-CM

## 2019-01-01 DIAGNOSIS — I10 HTN (HYPERTENSION): Primary | ICD-10-CM

## 2019-01-01 DIAGNOSIS — I48.91 A-FIB (HCC): Primary | ICD-10-CM
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
## 2019-01-01 DIAGNOSIS — J18.9 NOSOCOMIAL PNEUMONIA: Primary | ICD-10-CM

## 2019-01-01 DIAGNOSIS — R09.02 HYPOXIA: ICD-10-CM

## 2019-01-01 LAB
ADENOVIRUS PCR:: NEGATIVE
ALBUMIN SERPL-MCNC: 3 G/DL (ref 3.4–5)
ALBUMIN/GLOB SERPL: 0.6 {RATIO} (ref 1–2)
ALLENS TEST: POSITIVE
ALP LIVER SERPL-CCNC: 110 U/L (ref 55–142)
ALT SERPL-CCNC: 18 U/L (ref 13–56)
ANION GAP SERPL CALC-SCNC: 4 MMOL/L (ref 0–18)
ANION GAP SERPL CALC-SCNC: 6 MMOL/L (ref 0–18)
ANION GAP SERPL CALC-SCNC: 7 MMOL/L (ref 0–18)
ANION GAP SERPL CALC-SCNC: 7 MMOL/L (ref 0–18)
APTT PPP: 30.5 SECONDS (ref 26.1–34.6)
ARTERIAL BLD GAS O2 SATURATION: 90 % (ref 92–100)
ARTERIAL BLOOD GAS BASE EXCESS: 1.4
ARTERIAL BLOOD GAS HCO3: 28.8 MEQ/L (ref 22–26)
ARTERIAL BLOOD GAS PCO2: 55 MM HG (ref 35–45)
ARTERIAL BLOOD GAS PH: 7.33 (ref 7.35–7.45)
ARTERIAL BLOOD GAS PO2: 61 MM HG (ref 80–105)
AST SERPL-CCNC: 20 U/L (ref 15–37)
ATRIAL RATE: 110 BPM
B PERT DNA SPEC QL NAA+PROBE: NEGATIVE
BASOPHILS # BLD AUTO: 0.02 X10(3) UL (ref 0–0.2)
BASOPHILS # BLD AUTO: 0.02 X10(3) UL (ref 0–0.2)
BASOPHILS # BLD AUTO: 0.03 X10(3) UL (ref 0–0.2)
BASOPHILS NFR BLD AUTO: 0.1 %
BASOPHILS NFR BLD AUTO: 0.2 %
BASOPHILS NFR BLD AUTO: 0.2 %
BILIRUB SERPL-MCNC: 1.2 MG/DL (ref 0.1–2)
BILIRUB UR QL STRIP.AUTO: NEGATIVE
BUN BLD-MCNC: 12 MG/DL (ref 7–18)
BUN BLD-MCNC: 14 MG/DL (ref 7–18)
BUN BLD-MCNC: 15 MG/DL (ref 7–18)
BUN BLD-MCNC: 19 MG/DL (ref 7–18)
BUN/CREAT SERPL: 12.8 (ref 10–20)
BUN/CREAT SERPL: 16.9 (ref 10–20)
BUN/CREAT SERPL: 20.2 (ref 10–20)
BUN/CREAT SERPL: 23.4 (ref 10–20)
C PNEUM DNA SPEC QL NAA+PROBE: NEGATIVE
CALCIUM BLD-MCNC: 8.1 MG/DL (ref 8.5–10.1)
CALCIUM BLD-MCNC: 8.5 MG/DL (ref 8.5–10.1)
CALCIUM BLD-MCNC: 8.8 MG/DL (ref 8.5–10.1)
CALCIUM BLD-MCNC: 9.1 MG/DL (ref 8.5–10.1)
CALCULATED O2 SATURATION: 90 % (ref 92–100)
CARBOXYHEMOGLOBIN: 1.9 % SAT (ref 0–3)
CHLORIDE SERPL-SCNC: 100 MMOL/L (ref 98–107)
CHLORIDE SERPL-SCNC: 102 MMOL/L (ref 98–107)
CHLORIDE SERPL-SCNC: 103 MMOL/L (ref 98–107)
CHLORIDE SERPL-SCNC: 99 MMOL/L (ref 98–107)
CLARITY UR REFRACT.AUTO: CLEAR
CO2 SERPL-SCNC: 28 MMOL/L (ref 21–32)
CO2 SERPL-SCNC: 31 MMOL/L (ref 21–32)
CO2 SERPL-SCNC: 33 MMOL/L (ref 21–32)
CO2 SERPL-SCNC: 34 MMOL/L (ref 21–32)
COLOR UR AUTO: YELLOW
CORONAVIRUS 229E PCR:: NEGATIVE
CORONAVIRUS HKU1 PCR:: NEGATIVE
CORONAVIRUS NL63 PCR:: NEGATIVE
CORONAVIRUS OC43 PCR:: NEGATIVE
CREAT BLD-MCNC: 0.64 MG/DL (ref 0.55–1.02)
CREAT BLD-MCNC: 0.83 MG/DL (ref 0.55–1.02)
CREAT BLD-MCNC: 0.94 MG/DL (ref 0.55–1.02)
CREAT BLD-MCNC: 0.94 MG/DL (ref 0.55–1.02)
DEPRECATED RDW RBC AUTO: 50.5 FL (ref 35.1–46.3)
DEPRECATED RDW RBC AUTO: 50.5 FL (ref 35.1–46.3)
DEPRECATED RDW RBC AUTO: 50.9 FL (ref 35.1–46.3)
EOSINOPHIL # BLD AUTO: 0.01 X10(3) UL (ref 0–0.7)
EOSINOPHIL # BLD AUTO: 0.02 X10(3) UL (ref 0–0.7)
EOSINOPHIL # BLD AUTO: 0.05 X10(3) UL (ref 0–0.7)
EOSINOPHIL NFR BLD AUTO: 0 %
EOSINOPHIL NFR BLD AUTO: 0.2 %
EOSINOPHIL NFR BLD AUTO: 0.5 %
ERYTHROCYTE [DISTWIDTH] IN BLOOD BY AUTOMATED COUNT: 14.9 % (ref 11–15)
ERYTHROCYTE [DISTWIDTH] IN BLOOD BY AUTOMATED COUNT: 14.9 % (ref 11–15)
ERYTHROCYTE [DISTWIDTH] IN BLOOD BY AUTOMATED COUNT: 15.1 % (ref 11–15)
EXPIRATORY PRESSURE: 6 CM H2O
FIO2: 100 %
FLUAV RNA SPEC QL NAA+PROBE: NEGATIVE
FLUBV RNA SPEC QL NAA+PROBE: NEGATIVE
GLOBULIN PLAS-MCNC: 5 G/DL (ref 2.8–4.4)
GLUCOSE BLD-MCNC: 127 MG/DL (ref 70–99)
GLUCOSE BLD-MCNC: 132 MG/DL (ref 70–99)
GLUCOSE BLD-MCNC: 140 MG/DL (ref 70–99)
GLUCOSE BLD-MCNC: 95 MG/DL (ref 70–99)
GLUCOSE UR STRIP.AUTO-MCNC: NEGATIVE MG/DL
HBV SURFACE AG SER-ACNC: <0.1 [IU]/L
HBV SURFACE AG SERPL QL IA: NONREACTIVE
HCT VFR BLD AUTO: 30.4 % (ref 35–48)
HCT VFR BLD AUTO: 32.3 % (ref 35–48)
HCT VFR BLD AUTO: 42.2 % (ref 35–48)
HCV AB SERPL QL IA: NONREACTIVE
HGB BLD-MCNC: 10.4 G/DL (ref 12–16)
HGB BLD-MCNC: 13.7 G/DL (ref 12–16)
HGB BLD-MCNC: 9.8 G/DL (ref 12–16)
IMM GRANULOCYTES # BLD AUTO: 0.03 X10(3) UL (ref 0–1)
IMM GRANULOCYTES # BLD AUTO: 0.06 X10(3) UL (ref 0–1)
IMM GRANULOCYTES # BLD AUTO: 0.08 X10(3) UL (ref 0–1)
IMM GRANULOCYTES NFR BLD: 0.3 %
IMM GRANULOCYTES NFR BLD: 0.4 %
IMM GRANULOCYTES NFR BLD: 0.5 %
INR BLD: 1.61 (ref 0.9–1.1)
INR BLD: 1.79 (ref 0.9–1.1)
INR BLD: 1.88 (ref 0.9–1.1)
INR BLD: 1.9 (ref 0.9–1.1)
INR BLD: 1.92 (ref 0.9–1.1)
INR BLD: 2.16 (ref 0.9–1.1)
INR BLD: 2.17 (ref 0.9–1.1)
INR BLD: 2.48 (ref 0.9–1.1)
INR BLD: 2.81 (ref 0.9–1.1)
INR BLD: 3.71 (ref 0.9–1.1)
INSP PRESSURE: 14 CM H2O
KETONES UR STRIP.AUTO-MCNC: NEGATIVE MG/DL
LACTATE SERPL-SCNC: 1.3 MMOL/L (ref 0.4–2)
LACTATE SERPL-SCNC: 2.9 MMOL/L (ref 0.4–2)
LEGIONELLA PNEUMOPHILA AG, URI: NEGATIVE
LEUKOCYTE ESTERASE UR QL STRIP.AUTO: NEGATIVE
LYMPHOCYTES # BLD AUTO: 12.52 X10(3) UL (ref 1–4)
LYMPHOCYTES # BLD AUTO: 3.42 X10(3) UL (ref 1–4)
LYMPHOCYTES # BLD AUTO: 3.56 X10(3) UL (ref 1–4)
LYMPHOCYTES NFR BLD AUTO: 27.1 %
LYMPHOCYTES NFR BLD AUTO: 34.1 %
LYMPHOCYTES NFR BLD AUTO: 56.4 %
M PROTEIN MFR SERPL ELPH: 8 G/DL (ref 6.4–8.2)
MCH RBC QN AUTO: 29.7 PG (ref 26–34)
MCH RBC QN AUTO: 30.1 PG (ref 26–34)
MCH RBC QN AUTO: 30.1 PG (ref 26–34)
MCHC RBC AUTO-ENTMCNC: 32.2 G/DL (ref 31–37)
MCHC RBC AUTO-ENTMCNC: 32.2 G/DL (ref 31–37)
MCHC RBC AUTO-ENTMCNC: 32.5 G/DL (ref 31–37)
MCV RBC AUTO: 92.3 FL (ref 80–100)
MCV RBC AUTO: 92.7 FL (ref 80–100)
MCV RBC AUTO: 93.3 FL (ref 80–100)
METAPNEUMOVIRUS PCR:: NEGATIVE
METHEMOGLOBIN: 0.7 % SAT (ref 0.4–1.5)
MONOCYTES # BLD AUTO: 0.44 X10(3) UL (ref 0.1–1)
MONOCYTES # BLD AUTO: 0.65 X10(3) UL (ref 0.1–1)
MONOCYTES # BLD AUTO: 0.87 X10(3) UL (ref 0.1–1)
MONOCYTES NFR BLD AUTO: 2 %
MONOCYTES NFR BLD AUTO: 6.5 %
MONOCYTES NFR BLD AUTO: 6.6 %
MYCOPLASMA PNEUMONIA PCR:: NEGATIVE
NEUTROPHILS # BLD AUTO: 5.87 X10 (3) UL (ref 1.5–7.7)
NEUTROPHILS # BLD AUTO: 5.87 X10(3) UL (ref 1.5–7.7)
NEUTROPHILS # BLD AUTO: 8.62 X10 (3) UL (ref 1.5–7.7)
NEUTROPHILS # BLD AUTO: 8.62 X10(3) UL (ref 1.5–7.7)
NEUTROPHILS # BLD AUTO: 9.12 X10 (3) UL (ref 1.5–7.7)
NEUTROPHILS # BLD AUTO: 9.12 X10(3) UL (ref 1.5–7.7)
NEUTROPHILS NFR BLD AUTO: 41.1 %
NEUTROPHILS NFR BLD AUTO: 58.4 %
NEUTROPHILS NFR BLD AUTO: 65.4 %
NITRITE UR QL STRIP.AUTO: NEGATIVE
NT-PROBNP SERPL-MCNC: 4500 PG/ML (ref ?–450)
OSMOLALITY SERPL CALC.SUM OF ELEC: 280 MOSM/KG (ref 275–295)
OSMOLALITY SERPL CALC.SUM OF ELEC: 290 MOSM/KG (ref 275–295)
OSMOLALITY SERPL CALC.SUM OF ELEC: 292 MOSM/KG (ref 275–295)
OSMOLALITY SERPL CALC.SUM OF ELEC: 295 MOSM/KG (ref 275–295)
PARAINFLUENZA 1 PCR:: NEGATIVE
PARAINFLUENZA 2 PCR:: NEGATIVE
PARAINFLUENZA 3 PCR:: NEGATIVE
PARAINFLUENZA 4 PCR:: NEGATIVE
PATIENT TEMPERATURE: 97 F
PH UR STRIP.AUTO: 5 [PH] (ref 4.5–8)
PLATELET # BLD AUTO: 136 10(3)UL (ref 150–450)
PLATELET # BLD AUTO: 89 10(3)UL (ref 150–450)
PLATELET # BLD AUTO: 92 10(3)UL (ref 150–450)
POTASSIUM SERPL-SCNC: 3.3 MMOL/L (ref 3.5–5.1)
POTASSIUM SERPL-SCNC: 3.5 MMOL/L (ref 3.5–5.1)
POTASSIUM SERPL-SCNC: 3.9 MMOL/L (ref 3.5–5.1)
POTASSIUM SERPL-SCNC: 4 MMOL/L (ref 3.5–5.1)
POTASSIUM SERPL-SCNC: 4.1 MMOL/L (ref 3.5–5.1)
PROT UR STRIP.AUTO-MCNC: NEGATIVE MG/DL
PSA SERPL DL<=0.01 NG/ML-MCNC: 19.7 SECONDS (ref 12.4–14.7)
PSA SERPL DL<=0.01 NG/ML-MCNC: 21.4 SECONDS (ref 12.4–14.7)
PSA SERPL DL<=0.01 NG/ML-MCNC: 22.7 SECONDS (ref 12.4–14.7)
PSA SERPL DL<=0.01 NG/ML-MCNC: 22.9 SECONDS (ref 12.5–14.7)
PSA SERPL DL<=0.01 NG/ML-MCNC: 23.1 SECONDS (ref 12.5–14.7)
PSA SERPL DL<=0.01 NG/ML-MCNC: 24.9 SECONDS (ref 12.4–14.7)
PSA SERPL DL<=0.01 NG/ML-MCNC: 25.4 SECONDS (ref 12.5–14.7)
PSA SERPL DL<=0.01 NG/ML-MCNC: 28.5 SECONDS (ref 12.5–14.7)
PSA SERPL DL<=0.01 NG/ML-MCNC: 30.5 SECONDS (ref 12.4–14.7)
PSA SERPL DL<=0.01 NG/ML-MCNC: 37.9 SECONDS (ref 12.4–14.7)
Q-T INTERVAL: 368 MS
QRS DURATION: 86 MS
QTC CALCULATION (BEZET): 437 MS
R AXIS: 54 DEGREES
RAPID HIV: NONREACTIVE
RBC # BLD AUTO: 3.26 X10(6)UL (ref 3.8–5.3)
RBC # BLD AUTO: 3.5 X10(6)UL (ref 3.8–5.3)
RBC # BLD AUTO: 4.55 X10(6)UL (ref 3.8–5.3)
RBC UR QL AUTO: NEGATIVE
RHINOVIRUS/ENTERO PCR:: NEGATIVE
RSV RNA SPEC QL NAA+PROBE: NEGATIVE
SODIUM SERPL-SCNC: 134 MMOL/L (ref 136–145)
SODIUM SERPL-SCNC: 138 MMOL/L (ref 136–145)
SODIUM SERPL-SCNC: 140 MMOL/L (ref 136–145)
SODIUM SERPL-SCNC: 142 MMOL/L (ref 136–145)
SP GR UR STRIP.AUTO: 1.01 (ref 1–1.03)
STREPTOCOCCUS PNEUMONIAE AG, U: NEGATIVE
T AXIS: 175 DEGREES
TOTAL HEMOGLOBIN: 13.8 G/DL (ref 11.7–16)
TROPONIN I SERPL-MCNC: <0.045 NG/ML (ref ?–0.04)
UROBILINOGEN UR STRIP.AUTO-MCNC: <2 MG/DL
VENTRICULAR RATE: 85 BPM
WBC # BLD AUTO: 10 X10(3) UL (ref 4–11)
WBC # BLD AUTO: 13.2 X10(3) UL (ref 4–11)
WBC # BLD AUTO: 22.2 X10(3) UL (ref 4–11)

## 2019-01-01 PROCEDURE — 85610 PROTHROMBIN TIME: CPT

## 2019-01-01 PROCEDURE — 36415 COLL VENOUS BLD VENIPUNCTURE: CPT

## 2019-01-01 PROCEDURE — 87449 NOS EACH ORGANISM AG IA: CPT | Performed by: INTERNAL MEDICINE

## 2019-01-01 PROCEDURE — 74230 X-RAY XM SWLNG FUNCJ C+: CPT | Performed by: INTERNAL MEDICINE

## 2019-01-01 PROCEDURE — 82803 BLOOD GASES ANY COMBINATION: CPT | Performed by: EMERGENCY MEDICINE

## 2019-01-01 PROCEDURE — 5A09357 ASSISTANCE WITH RESPIRATORY VENTILATION, LESS THAN 24 CONSECUTIVE HOURS, CONTINUOUS POSITIVE AIRWAY PRESSURE: ICD-10-PCS | Performed by: EMERGENCY MEDICINE

## 2019-01-01 PROCEDURE — 87581 M.PNEUMON DNA AMP PROBE: CPT | Performed by: EMERGENCY MEDICINE

## 2019-01-01 PROCEDURE — 85610 PROTHROMBIN TIME: CPT | Performed by: HOSPITALIST

## 2019-01-01 PROCEDURE — 94002 VENT MGMT INPAT INIT DAY: CPT

## 2019-01-01 PROCEDURE — 86701 HIV-1ANTIBODY: CPT | Performed by: INTERNAL MEDICINE

## 2019-01-01 PROCEDURE — 80048 BASIC METABOLIC PNL TOTAL CA: CPT

## 2019-01-01 PROCEDURE — 92610 EVALUATE SWALLOWING FUNCTION: CPT

## 2019-01-01 PROCEDURE — 85025 COMPLETE CBC W/AUTO DIFF WBC: CPT | Performed by: EMERGENCY MEDICINE

## 2019-01-01 PROCEDURE — 86803 HEPATITIS C AB TEST: CPT | Performed by: INTERNAL MEDICINE

## 2019-01-01 PROCEDURE — 80048 BASIC METABOLIC PNL TOTAL CA: CPT | Performed by: INTERNAL MEDICINE

## 2019-01-01 PROCEDURE — 93005 ELECTROCARDIOGRAM TRACING: CPT

## 2019-01-01 PROCEDURE — 87899 AGENT NOS ASSAY W/OPTIC: CPT | Performed by: INTERNAL MEDICINE

## 2019-01-01 PROCEDURE — 84132 ASSAY OF SERUM POTASSIUM: CPT | Performed by: EMERGENCY MEDICINE

## 2019-01-01 PROCEDURE — 99291 CRITICAL CARE FIRST HOUR: CPT

## 2019-01-01 PROCEDURE — 96365 THER/PROPH/DIAG IV INF INIT: CPT

## 2019-01-01 PROCEDURE — 94640 AIRWAY INHALATION TREATMENT: CPT

## 2019-01-01 PROCEDURE — 84132 ASSAY OF SERUM POTASSIUM: CPT | Performed by: INTERNAL MEDICINE

## 2019-01-01 PROCEDURE — 51701 INSERT BLADDER CATHETER: CPT

## 2019-01-01 PROCEDURE — 81003 URINALYSIS AUTO W/O SCOPE: CPT | Performed by: EMERGENCY MEDICINE

## 2019-01-01 PROCEDURE — 97530 THERAPEUTIC ACTIVITIES: CPT

## 2019-01-01 PROCEDURE — 85610 PROTHROMBIN TIME: CPT | Performed by: EMERGENCY MEDICINE

## 2019-01-01 PROCEDURE — 84450 TRANSFERASE (AST) (SGOT): CPT | Performed by: EMERGENCY MEDICINE

## 2019-01-01 PROCEDURE — 87486 CHLMYD PNEUM DNA AMP PROBE: CPT | Performed by: EMERGENCY MEDICINE

## 2019-01-01 PROCEDURE — 94664 DEMO&/EVAL PT USE INHALER: CPT

## 2019-01-01 PROCEDURE — 71045 X-RAY EXAM CHEST 1 VIEW: CPT | Performed by: EMERGENCY MEDICINE

## 2019-01-01 PROCEDURE — 80053 COMPREHEN METABOLIC PANEL: CPT | Performed by: EMERGENCY MEDICINE

## 2019-01-01 PROCEDURE — 87798 DETECT AGENT NOS DNA AMP: CPT | Performed by: EMERGENCY MEDICINE

## 2019-01-01 PROCEDURE — 85025 COMPLETE CBC W/AUTO DIFF WBC: CPT | Performed by: INTERNAL MEDICINE

## 2019-01-01 PROCEDURE — 85025 COMPLETE CBC W/AUTO DIFF WBC: CPT | Performed by: HOSPITALIST

## 2019-01-01 PROCEDURE — 92611 MOTION FLUOROSCOPY/SWALLOW: CPT

## 2019-01-01 PROCEDURE — 83880 ASSAY OF NATRIURETIC PEPTIDE: CPT | Performed by: EMERGENCY MEDICINE

## 2019-01-01 PROCEDURE — 83050 HGB METHEMOGLOBIN QUAN: CPT | Performed by: EMERGENCY MEDICINE

## 2019-01-01 PROCEDURE — 97116 GAIT TRAINING THERAPY: CPT

## 2019-01-01 PROCEDURE — 85018 HEMOGLOBIN: CPT | Performed by: EMERGENCY MEDICINE

## 2019-01-01 PROCEDURE — 82375 ASSAY CARBOXYHB QUANT: CPT | Performed by: EMERGENCY MEDICINE

## 2019-01-01 PROCEDURE — 83605 ASSAY OF LACTIC ACID: CPT | Performed by: EMERGENCY MEDICINE

## 2019-01-01 PROCEDURE — 92526 ORAL FUNCTION THERAPY: CPT

## 2019-01-01 PROCEDURE — 80048 BASIC METABOLIC PNL TOTAL CA: CPT | Performed by: PHYSICIAN ASSISTANT

## 2019-01-01 PROCEDURE — 87633 RESP VIRUS 12-25 TARGETS: CPT | Performed by: EMERGENCY MEDICINE

## 2019-01-01 PROCEDURE — 87040 BLOOD CULTURE FOR BACTERIA: CPT | Performed by: EMERGENCY MEDICINE

## 2019-01-01 PROCEDURE — 97162 PT EVAL MOD COMPLEX 30 MIN: CPT

## 2019-01-01 PROCEDURE — 87340 HEPATITIS B SURFACE AG IA: CPT | Performed by: INTERNAL MEDICINE

## 2019-01-01 PROCEDURE — 36600 WITHDRAWAL OF ARTERIAL BLOOD: CPT | Performed by: EMERGENCY MEDICINE

## 2019-01-01 PROCEDURE — 85730 THROMBOPLASTIN TIME PARTIAL: CPT | Performed by: EMERGENCY MEDICINE

## 2019-01-01 PROCEDURE — 87999 UNLISTED MICROBIOLOGY PX: CPT

## 2019-01-01 PROCEDURE — 84484 ASSAY OF TROPONIN QUANT: CPT | Performed by: EMERGENCY MEDICINE

## 2019-01-01 PROCEDURE — 93010 ELECTROCARDIOGRAM REPORT: CPT

## 2019-01-01 PROCEDURE — 97165 OT EVAL LOW COMPLEX 30 MIN: CPT

## 2019-01-01 RX ORDER — IPRATROPIUM BROMIDE AND ALBUTEROL SULFATE 2.5; .5 MG/3ML; MG/3ML
3 SOLUTION RESPIRATORY (INHALATION)
Status: DISCONTINUED | OUTPATIENT
Start: 2019-01-01 | End: 2019-01-01

## 2019-01-01 RX ORDER — ALFUZOSIN HYDROCHLORIDE 10 MG/1
10 TABLET, EXTENDED RELEASE ORAL
Status: DISCONTINUED | OUTPATIENT
Start: 2019-01-01 | End: 2019-01-01

## 2019-01-01 RX ORDER — POLYETHYLENE GLYCOL 3350 17 G/17G
17 POWDER, FOR SOLUTION ORAL DAILY PRN
Status: DISCONTINUED | OUTPATIENT
Start: 2019-01-01 | End: 2019-01-01

## 2019-01-01 RX ORDER — WARFARIN SODIUM 1 MG/1
1 TABLET ORAL
Status: COMPLETED | OUTPATIENT
Start: 2019-01-01 | End: 2019-01-01

## 2019-01-01 RX ORDER — SODIUM PHOSPHATE, DIBASIC AND SODIUM PHOSPHATE, MONOBASIC 7; 19 G/133ML; G/133ML
1 ENEMA RECTAL ONCE AS NEEDED
Status: DISCONTINUED | OUTPATIENT
Start: 2019-01-01 | End: 2019-01-01

## 2019-01-01 RX ORDER — MAGNESIUM OXIDE 400 MG (241.3 MG MAGNESIUM) TABLET
3 TABLET NIGHTLY
Status: DISCONTINUED | OUTPATIENT
Start: 2019-01-01 | End: 2019-01-01

## 2019-01-01 RX ORDER — WARFARIN SODIUM 2.5 MG/1
2.5 TABLET ORAL
Status: COMPLETED | OUTPATIENT
Start: 2019-01-01 | End: 2019-01-01

## 2019-01-01 RX ORDER — ACETAMINOPHEN 325 MG/1
650 TABLET ORAL EVERY 6 HOURS PRN
Status: DISCONTINUED | OUTPATIENT
Start: 2019-01-01 | End: 2019-01-01

## 2019-01-01 RX ORDER — BUMETANIDE 1 MG/1
1 TABLET ORAL DAILY
Status: DISCONTINUED | OUTPATIENT
Start: 2019-01-01 | End: 2019-01-01

## 2019-01-01 RX ORDER — SODIUM CHLORIDE 9 MG/ML
INJECTION, SOLUTION INTRAVENOUS ONCE
Status: COMPLETED | OUTPATIENT
Start: 2019-01-01 | End: 2019-01-01

## 2019-01-01 RX ORDER — POTASSIUM CHLORIDE 20 MEQ/1
40 TABLET, EXTENDED RELEASE ORAL EVERY 4 HOURS
Status: COMPLETED | OUTPATIENT
Start: 2019-01-01 | End: 2019-01-01

## 2019-01-01 RX ORDER — IPRATROPIUM BROMIDE AND ALBUTEROL SULFATE 2.5; .5 MG/3ML; MG/3ML
3 SOLUTION RESPIRATORY (INHALATION) 3 TIMES DAILY
Status: DISCONTINUED | OUTPATIENT
Start: 2019-01-01 | End: 2019-01-01

## 2019-01-01 RX ORDER — IPRATROPIUM BROMIDE AND ALBUTEROL SULFATE 2.5; .5 MG/3ML; MG/3ML
3 SOLUTION RESPIRATORY (INHALATION) EVERY 4 HOURS PRN
Status: DISCONTINUED | OUTPATIENT
Start: 2019-01-01 | End: 2019-01-01

## 2019-01-01 RX ORDER — WARFARIN SODIUM 2 MG/1
4 TABLET ORAL
Status: COMPLETED | OUTPATIENT
Start: 2019-01-01 | End: 2019-01-01

## 2019-01-01 RX ORDER — WARFARIN SODIUM 4 MG/1
4 TABLET ORAL SEE ADMIN INSTRUCTIONS
Status: ON HOLD | COMMUNITY
End: 2019-01-01

## 2019-01-01 RX ORDER — TRAZODONE HYDROCHLORIDE 50 MG/1
100 TABLET ORAL NIGHTLY
Status: DISCONTINUED | OUTPATIENT
Start: 2019-01-01 | End: 2019-01-01

## 2019-01-01 RX ORDER — PAROXETINE 10 MG/1
10 TABLET, FILM COATED ORAL NIGHTLY
Status: DISCONTINUED | OUTPATIENT
Start: 2019-01-01 | End: 2019-01-01

## 2019-01-01 RX ORDER — ENOXAPARIN SODIUM 100 MG/ML
40 INJECTION SUBCUTANEOUS DAILY
Status: DISCONTINUED | OUTPATIENT
Start: 2019-01-01 | End: 2019-01-01

## 2019-01-01 RX ORDER — WARFARIN SODIUM 2 MG/1
4 TABLET ORAL SEE ADMIN INSTRUCTIONS
Status: DISCONTINUED | OUTPATIENT
Start: 2019-01-01 | End: 2019-01-01 | Stop reason: SDUPTHER

## 2019-01-01 RX ORDER — BUMETANIDE 1 MG/1
1 TABLET ORAL ONCE
Status: COMPLETED | OUTPATIENT
Start: 2019-01-01 | End: 2019-01-01

## 2019-01-01 RX ORDER — DOCUSATE SODIUM 100 MG/1
100 CAPSULE, LIQUID FILLED ORAL 2 TIMES DAILY
Status: DISCONTINUED | OUTPATIENT
Start: 2019-01-01 | End: 2019-01-01

## 2019-01-01 RX ORDER — DONEPEZIL HYDROCHLORIDE 5 MG/1
5 TABLET, FILM COATED ORAL NIGHTLY
Status: DISCONTINUED | OUTPATIENT
Start: 2019-01-01 | End: 2019-01-01

## 2019-01-01 RX ORDER — PAROXETINE 10 MG/1
10 TABLET, FILM COATED ORAL NIGHTLY
COMMUNITY

## 2019-01-01 RX ORDER — DILTIAZEM HYDROCHLORIDE 180 MG/1
180 CAPSULE, EXTENDED RELEASE ORAL DAILY
Status: DISCONTINUED | OUTPATIENT
Start: 2019-01-01 | End: 2019-01-01

## 2019-01-01 RX ORDER — ONDANSETRON 2 MG/ML
4 INJECTION INTRAMUSCULAR; INTRAVENOUS EVERY 6 HOURS PRN
Status: DISCONTINUED | OUTPATIENT
Start: 2019-01-01 | End: 2019-01-01

## 2019-01-01 RX ORDER — LOSARTAN POTASSIUM 100 MG/1
100 TABLET ORAL DAILY
Status: DISCONTINUED | OUTPATIENT
Start: 2019-01-01 | End: 2019-01-01

## 2019-01-01 RX ORDER — CEFUROXIME AXETIL 500 MG/1
500 TABLET ORAL 2 TIMES DAILY
Qty: 6 TABLET | Refills: 0 | Status: SHIPPED | OUTPATIENT
Start: 2019-01-01 | End: 2019-01-01

## 2019-01-01 RX ORDER — BISACODYL 10 MG
10 SUPPOSITORY, RECTAL RECTAL
Status: DISCONTINUED | OUTPATIENT
Start: 2019-01-01 | End: 2019-01-01

## 2019-01-01 RX ORDER — METOCLOPRAMIDE HYDROCHLORIDE 5 MG/ML
5 INJECTION INTRAMUSCULAR; INTRAVENOUS EVERY 8 HOURS PRN
Status: DISCONTINUED | OUTPATIENT
Start: 2019-01-01 | End: 2019-01-01

## 2019-01-01 RX ORDER — NEOMYCIN/POLYMYXIN B/PRAMOXINE 3.5-10K-1
1 CREAM (GRAM) TOPICAL DAILY
COMMUNITY

## 2019-01-01 RX ORDER — TRAZODONE HYDROCHLORIDE 50 MG/1
100 TABLET ORAL NIGHTLY
COMMUNITY

## 2019-01-01 RX ORDER — WARFARIN SODIUM 2 MG/1
2 TABLET ORAL SEE ADMIN INSTRUCTIONS
Status: ON HOLD | COMMUNITY
End: 2019-01-01

## 2019-01-05 NOTE — PROGRESS NOTES
[facility] Hospitalist Progress Note     [place] Patient Status:  Inpatient    1924 MRN [id number]   [facility] 7NE-A Attending Mateo Christopher MD   [facility] Day # 6 PCP Irving Lutz MD     CC: follow up    SUBJECTIVE:    Breath
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
## 2019-02-25 PROBLEM — R09.02 HYPOXIA: Status: ACTIVE | Noted: 2019-01-01

## 2019-02-25 PROBLEM — J18.9 NOSOCOMIAL PNEUMONIA: Status: ACTIVE | Noted: 2019-01-01

## 2019-02-25 NOTE — SLP NOTE
ADULT SWALLOWING EVALUATION    ASSESSMENT    ASSESSMENT/OVERALL IMPRESSION:  Orders were received for a bedside swallowing evaluation as patient admitted with right sided infiltrate noted on CXR.  Patient had a VFSS in 11/2018 revealing a functional orophar Thoracic compression fracture [facility]) 10/2014    • Valvular disease    • Visual impairment     glasses       Prior Living Situation: Assisted living([facility])  Diet Prior to Admission: Regular; Thin liquids  Precautions: Aspiration    Patient/Family Pt will participate in VFSS to objectively assess swallow function, r/o aspiration and determine safest/least restrictive means of nutrition/hydration.     In Progress                                   FOLLOW UP  Treatment Plan/Recommendations: Videofluoros

## 2019-02-25 NOTE — PROGRESS NOTES
[address] [facility] Dosing Service  Warfarin (Coumadin) Initial Dosing    Alex Michael is a 80year old female with a history of afib (on outpatient coumadin) for whom pharmacy has been consulted to dose warfarin (COUMADIN) by Dr. Veronica Graham.   Based on this i

## 2019-02-25 NOTE — CONSULTS
Critical Care H&P/Consult       NAME: [place] - ROOM: 773/546-F - MRN: [id number] - Age: 80year old - :  1924    Date of Admission: 2019  8:10 AM  Admission Diagnosis: Hypoxia [R09.02]  Nosocomial pneumonia [J18.9]  Reason for Consult hyst    cystocele repair        Medications Prior to Admission:  metoprolol Tartrate 25 MG Oral Tab Take 25 mg by mouth 2 (two) times daily. Disp:  Rfl:  2/24/2019 at Unknown time   PARoxetine HCl 10 MG Oral Tab Take 10 mg by mouth nightly.  Disp:  Rfl:  2/ file      Highest education level: Not on file    Occupational History      Occupation: retired     Social Needs      Financial resource strain: Not on file      Food insecurity:        Worry: Not on file        Inability: Not on file      [employer] MG Oral Tab Take 4 mg by mouth See Admin Instructions. Two days a week wednesdays and Fridays Disp:  Rfl:    Warfarin Sodium 2 MG Oral Tab Take 2 mg by mouth See Admin Instructions.  Five days a week Mondays, Tuesdays, Thursdays, Saturdays and Sundays Disp: vision or double vision   HEENT:  denies nasal congestion, sinus pain/tenderness  RESP:  See above  CARDS:  denies current chest pain   GI:  denies abdominal pain  :  denies dysuria or changes in stream   MSK:  denies back pain   NEURO:  denies headaches throughout         Recent Labs      02/25/19 0823 02/25/19 0932   WBC  22.2*   --    HGB  13.7   --    MCV  92.7   --    PLT  136.0*   --    INR   --   1.88*       Recent Labs      02/25/19 0823 02/25/19 0932   NA  134*   --    K   --   3.9   CL

## 2019-02-25 NOTE — CONSULTS
[facility]  Report of Consultation    Art Ordaz Patient Status:  Inpatient    1924 MRN [id number]   [facility] 5NW-A Attending Cody Esteban MD   Hosp Day # 0 PCP Gordon Guerra MD     Reason for Consultation:  Acute resp was admitted with severe HTN and flash pulm edema. Recently changed lasix to bumex with resolution of leg edema. Today, pt in resp distress, hypoxic so 911 called.   Pt placed on BiPAP in ER and Abx started for right sided pneumonia with quick improveme docusate sodium (COLACE) cap 100 mg, 100 mg, Oral, BID  •  PEG 3350 (MIRALAX) powder packet 17 g, 17 g, Oral, Daily PRN  •  magnesium hydroxide (MILK OF MAGNESIA) 400 MG/5ML suspension 30 mL, 30 mL, Oral, Daily PRN  •  bisacodyl (DULCOLAX) rectal supposito wheezes, rhonchi or dullness. Normal excursions and effort. Abdomen: Soft, non-tender, non-distended. No hepatosplenomegaly, bruits, masses or pulsatile masses  Extremities: Without clubbing, cyanosis or edema. Peripheral pulses are 2+. Neurologic:  Al

## 2019-02-25 NOTE — ED INITIAL ASSESSMENT (HPI)
Pt to er with ems for erum and low o2 sat  With hx of chf and recently being switched from lasix to bumex for increased edema  Pt arrives on bypap by ems

## 2019-02-25 NOTE — ED PROVIDER NOTES
Patient Seen in: [facility] Emergency Department    History   Patient presents with:  Dyspnea ERUM SOB (respiratory)    Stated Complaint: erum    HPI    51-year-old white female was brought to the emergency room today frequent of difficulty breathing. Smoking status: Never Smoker      Smokeless tobacco: Never Used    Alcohol use: No      Comment: 3-4 drinks per week    Drug use: No      Review of Systems    Positive for stated complaint: erum  Other systems are as noted in HPI.   Constitutional and vital components within normal limits   ARTERIAL BLOOD GAS - Abnormal; Notable for the following components:    ABG pH 7.33 (*)     ABG pCO2 55 (*)     ABG pO2 61 (*)     ABG HCO3 28.8 (*)     ABG O2 Saturation 90 (*)     Calculated O2 Saturation 90 (*)     All calcified thoracic aorta.  Enlarged cardiac silhouette again noted.  Mild bilateral pleural effusions.  No measurable pneumothorax.      Impression     CONCLUSION:  Severe airspace disease throughout the right lung is suspicious for pneumonia.  Followup im a DNR family states that she does not wish to have advanced heroic efforts such as intubation and she will be kept with BiPAP for now.   Patient is significantly ill has a high probability of potentially decompensating considering her advanced medical probl

## 2019-02-25 NOTE — H&P
[facility] Hospitalist History and Physical      Patient presents with:  Dyspnea NORMAN SOB (respiratory)       PCP: Jenise Rondon MD      History of Present Illness: Patient is a 80year old female with PMH sig forafib, CHF, HTN, HLD who presented to the ED with s Pulse 63   Temp 98.2 °F (36.8 °C) (Axillary)   Resp 19   LMP  (LMP Unknown)   SpO2 96%   General:  On BiPAP   Head:  Normocephalic, without obvious abnormality, atraumatic. Eyes:  Sclera anicteric, No conjunctival pallor, EOMs intact.     Nose: Nares no FINDINGS:  Severe airspace disease throughout the right lung. Left basilar atelectasis and/or scarring. Tortuous and calcified thoracic aorta. Enlarged cardiac silhouette again noted. Mild bilateral pleural effusions. No measurable pneumothorax.

## 2019-02-25 NOTE — PLAN OF CARE
Admission navigator completed. Family at beside. Pt A/Ox3. Forgetful. On BIPAP and . Incont/ brief. Sleepy but arousable and answering questions. Call light within reach. Will cont to monitor.

## 2019-02-26 NOTE — PROGRESS NOTES
[facility]  Cardiology Progress Note    Amauri Borden Patient Status:  Inpatient    1924 MRN [id number]   [facility] 5NW-A Attending Nathan Aleman MD   [facility] Day # 1 PCP Amira Rankin MD     Assessment:  Acute respirato Daily   • dilTIAZem HCl ER Coated Beads  180 mg Oral Daily   • Donepezil HCl  5 mg Oral Nightly   • metoprolol Tartrate  25 mg Oral 2x Daily(Beta Blocker)   • Losartan Potassium  100 mg Oral Daily   • PARoxetine HCl  10 mg Oral Nightly   • traZODone HCl  1

## 2019-02-26 NOTE — DIETARY NOTE
[address] [facility]     Admitting diagnosis:  Hypoxia [R09.02]  Nosocomial pneumonia [J18.9]    Ht: 5'5\"  Wt: 59.6 kg (131 lb 6.4 oz). BMI: Body mass index is 21.87 kg/m².   IBW: Ideal body weight: 57 kg (125 lb 10.6

## 2019-02-26 NOTE — PROGRESS NOTES
Pharmacy Dosing Service: Warfarin (Coumadin)  Naresh Melchor is a 80year old female with a history of afib (on outpatient coumadin) for whom pharmacy has been dosing warfarin (COUMADIN) per consult from Dr. Sarah Hong on 2/25/19.   Based on this indicati

## 2019-02-26 NOTE — PROGRESS NOTES
[facility] Hospitalist Progress Note                                                                   [address]  1/17/1924    SUBJECTIVE:   Breathing much improved.   On 1 L NC  OBJECTIVE: piperacillin-tazobactam  3.375 g Intravenous Q8H     Continuous Infusions:   PRN: acetaminophen, PEG 3350, magnesium hydroxide, bisacodyl, FLEET ENEMA, ondansetron HCl, Metoclopramide HCl    Assessment/Plan:  Principal Problem:    Nosocomial pneumonia  Act

## 2019-02-26 NOTE — PLAN OF CARE
Impaired Swallowing    • Minimize aspiration risk Progressing          Impaired Swallowing    • Minimize aspiration risk Progressing        Pt is alert and oriented. Currently on 3L NC; O2 is 98%. Briefed; incontinent at times.   Up with assist to commode

## 2019-02-26 NOTE — PLAN OF CARE
Pt brief dry this morning. Needed a lot of encouragement to get up to commode. Had a BM, soft, formed. Minimal amount of urine. Bladder scanned after returning to bed; 565. Straight cath per protocol. 500 out. UA sent. Will continue to monitor.

## 2019-02-26 NOTE — PROGRESS NOTES
[address] Patient Status:  Inpatient    1924 MRN [id number]   [facility] 5NW-A Attending Nathan Aleman MD   Hosp Day # 1 PCP Amira Rankin MD     SUBJECTIVE: Pt denies complaints this am.  No cough. Exam:                          NLDEDFR: WESTD, cooperative, in NAD                          HEENT: oropharynx clear without erythema or exudates, moist mucous membranes                          Lungs: Clear to auscultation bilaterally                       MD  2/26/2019  10:08 AM

## 2019-02-26 NOTE — PLAN OF CARE
DISCHARGE PLANNING    • Discharge to home or other facility with appropriate resources Progressing        Impaired Swallowing    • Minimize aspiration risk Progressing        PAIN - ADULT    • Verbalizes/displays adequate comfort level or patient's stated Chronic diastolic heart failure (HCC)     Palliative care encounter     Goals of care, counseling/discussion     Nosocomial pneumonia     Hypoxia       Active issue(s) requiring resolution prior to discharge: iv abx    Anticipated discharge date: tbd

## 2019-02-26 NOTE — SLP NOTE
ADULT VIDEOFLUOROSCOPIC SWALLOWING STUDY    Admission Date: 2/25/2019  Evaluation Date: 02/26/19  Radiologist: Dr. Ashanti Gallardo   Diet Recommendations - Solids: Regular  Diet Recommendations - Liquid: Nectar thick    Further Follow-up:  F and assess for compensatory strategies to improve safe swallow function. THIN LIQUIDS  Method of Presentation: Teaspoon;Cup  Oral Phase of Swallow (VFSS - Thin Liquids):  Within Functional Limits  Triggered at: Valleculae  Residue Severity, Location: Mil the laryngeal vestibule. Oral phase revealed functional oral acceptance, retrieval, containment, mastication and transit.   Pharyngeal phase revealed timely initiation of the pharyngeal swallow with partial approximation of the arytenoids to the epiglot

## 2019-02-27 NOTE — PHYSICAL THERAPY NOTE
PHYSICAL THERAPY EVALUATION - INPATIENT     Room Number: 575/592-B  Evaluation Date: 2/27/2019  Type of Evaluation: Initial  Physician Order: PT Eval and Treat    Presenting Problem: Shortness of breath; acute respiratory distress  Reason for Therapy uses a RW to walk down to meals. Her dtr assists her ADLs as needed. Pt reports she receives assistance in medication management. SUBJECTIVE  \"Can I have a tissue box? \"    Patient self-stated goal is to eat her breakfast.     OBJECTIVE  Precautions: minute): 2      AM-PAC '6-Clicks' INPATIENT SHORT FORM - BASIC MOBILITY  How much difficulty does the patient currently have. ..  -   Turning over in bed (including adjusting bedclothes, sheets and blankets)?: A Little   -   Sitting down on and standing up provided to align RW close enough to commode. During tolieting, pt required assist with wiping. Able to stand within RW and maintain static standing c Sup while SPT helped pt clean up.  Pt then amb x 10 ft back to sit EOB c RW c Min A and continued manual a limitations in independent bed mobility, transfers, and gait. The patient is below baseline and would benefit from skilled inpatient PT to address the above deficits to assist patient in returning to prior to level of function.   DISCHARGE RECOMMENDATIONS

## 2019-02-27 NOTE — SLP NOTE
SPEECH DAILY NOTE - INPATIENT    ASSESSMENT & PLAN   ASSESSMENT  Pt seen for dysphagia tx to assess tolerance with recommended diet, ensure appropriate utilization of aspiration precautions and provide pt/family education.  Pt found sitting upright in bed w of Visits to Meet Established Goals: 1    Session: 1      If you have any questions, please contact Olga Galeas

## 2019-02-27 NOTE — PROGRESS NOTES
Pharmacy Dosing Service: Warfarin (Coumadin)  Sara Valadez is a 80year old female with a history of afib (on outpatient coumadin) for whom pharmacy has been dosing warfarin (COUMADIN) per consult from Dr. Thuy Dias on 2/25/19.   Based on this indicati

## 2019-02-27 NOTE — PLAN OF CARE
Drowsy, fatigued, oriented x3-4, forgetful. Received pt on 3L, weaned to 1L, will continue to wean to baseline room air as tolerated. Lungs diminished bilaterally, no cough.  Aspiration precautions, nectar thickened liquids, pills whole in applesauce one at

## 2019-02-27 NOTE — PROGRESS NOTES
[facility]  Cardiology Progress Note    Susie Moss Patient Status:  Inpatient    1924 MRN [id number]   [facility] 5NW-A Attending Giorgio Nap Day # 2 PCP Jenise Rondon MD     Assessment:  Aspiration pne Donepezil HCl  5 mg Oral Nightly   • metoprolol Tartrate  25 mg Oral 2x Daily(Beta Blocker)   • Losartan Potassium  100 mg Oral Daily   • PARoxetine HCl  10 mg Oral Nightly   • traZODone HCl  100 mg Oral Nightly   • piperacillin-tazobactam  3.375 g Berna Yanez

## 2019-02-27 NOTE — PROGRESS NOTES
[facility] Hospitalist Progress Note                                                                   [address]  1/17/1924    SUBJECTIVE:   Doing well.  No complaints      OBJECTIVE:  Temp: Intravenous Q8H     Continuous Infusions:   PRN: artificial saliva sustitute, ipratropium-albuterol, acetaminophen, PEG 3350, magnesium hydroxide, bisacodyl, FLEET ENEMA, ondansetron HCl, Metoclopramide HCl    Assessment/Plan:  Principal Problem:    Nosoco

## 2019-02-27 NOTE — PROGRESS NOTES
Pulmonary Progress Note        NAME: [place] - ROOM: 260/229-F - MRN: [id number] - Age: 80year old - : 1924        Last 24hrs: No events overnight, breathing is better though not yet at baseline    OBJECTIVE:   19  233 02/25/19   0823  02/25/19   0932  02/26/19   0638  02/26/19 1953   NA  134*   --   138   --    K   --   3.9  3.5  4.1   CL  99   --   100   --    CO2  28.0   --   31.0   --    BUN  14   --   19*   --    CA  9.1   --   8.1*   --        Recent Labs      02

## 2019-02-28 NOTE — SLP NOTE
Attempted to see patient for follow up however currently in bathroom on commode. Spoke with daughter present at bedside who reported good tolerance with nectar thick liquids in her presence today. Will reattempt as available and appropriate.      Digna Restrepo

## 2019-02-28 NOTE — CM/SW NOTE
[facility] Pt is a 79 y/o woman admitted from [facility]. Attempted to meet with pt, however pt sleeping soundly. Attempted to reach pt's dtr, Haresh Cameron ([phone]) - message left.   Pt noted to use O2 PRN and h

## 2019-02-28 NOTE — CM/SW NOTE
Care Management/BPCI:    Met with patient/family at bedside to explain the BPCI/Medicare program. Family agreed with phone f/u for 3 months from [facility] after discharge from [facility]. Patient was enrolled under .  BPCI/Medicare Letter and B

## 2019-02-28 NOTE — PHYSICAL THERAPY NOTE
PHYSICAL THERAPY TREATMENT NOTE - INPATIENT    Room Number: 483/444-Z     Session: 1  Number of Visits to Meet Established Goals: 5    Presenting Problem: Shortness of breath; acute respiratory distress    History related to current admission: Pt was admi Dropped to low 80s upon sitting on 2L O2 NC. Educated on breathing techniques. Pt repeatedly kept taking her NC in/out of nose to blow nose. Following walk, SPO2 mid to low 80s on 2LO2 NC, took ~3 min to recover back to >90%. End of aepfbac90%. slipping to then grab onto RW. Amb x 50 ft c Min A at trunk for safety and continuous VC's for RW management, as pt exhibits inconsistent safe RW management.  Pt puts RW too far out in front of her and requires occasional manual assist to maintain toes w/ level: supervision. Goal met.       Goal #3 Patient is able to ambulate 50 feet with assist device: walker - rolling at assistance level: supervision      Goal #4 Patient is able to demonstrate PLB technique @ independent.     Goal #5     Goal #6     Goal C

## 2019-02-28 NOTE — PROGRESS NOTES
[address]  [facility] Patient Status:  Inpatient    1924 MRN [id number]   [facility] 5NW-A Attending Chen Salas,*   Hosp Day # 3 PCP Rikki Bradley MD     Pulm / Critical Care Progress Note     S: pt states t BS.   Extremity: no edema         Recent Labs   Lab  02/25/19   0823  02/26/19   0638  02/27/19   1609   WBC  22.2*  13.2*  10.0   HGB  13.7  9.8*  10.4*   HCT  42.2  30.4*  32.3*   PLT  136.0*  89.0*  92.0*     Recent Labs   Lab  02/26/19   9879  02/27/19

## 2019-02-28 NOTE — PROGRESS NOTES
[facility] Hospitalist Progress Note                                                                   [address]  1/17/1924    SUBJECTIVE:     Looks better today  On 2L NC currently  Has bee PARoxetine HCl  10 mg Oral Nightly   • traZODone HCl  100 mg Oral Nightly   • piperacillin-tazobactam  3.375 g Intravenous Q8H     Continuous Infusions:   PRN: artificial saliva sustitute, ipratropium-albuterol, acetaminophen, PEG 3350, magnesium hydroxide

## 2019-02-28 NOTE — PROGRESS NOTES
[facility]  Cardiology Progress Note    Noe Powell Patient Status:  Inpatient    1924 MRN [id number]   [facility] 5NW-A Attending Richard Pimentel Day # 3 PCP Eddie Saravia MD     Assessment:  Aspiration pne Warm and dry, no obvious rashes     MEDICATIONS:  • Warfarin Sodium  2.5 mg Oral Once at night   • bumetanide  1 mg Oral Daily   • dilTIAZem HCl ER Coated Beads  180 mg Oral Daily   • Donepezil HCl  5 mg Oral Nightly   • metoprolol Tartrate  25 mg Oral 2x

## 2019-02-28 NOTE — PLAN OF CARE
DISCHARGE PLANNING    • Discharge to home or other facility with appropriate resources Progressing        Impaired Functional Mobility    • Achieve highest/safest level of mobility/gait Progressing        Impaired Functional Mobility    • Achieve highest/s

## 2019-02-28 NOTE — CONSULTS
[facility] Urology   Consultation Note    Susie Moss Patient Status:  Inpatient    1924 MRN [id number]   [facility] 5NW-A Attending Sandy Huber,*   Hosp Day # 3 PCP Jenise Rondon MD     Reason for Consultat maternal aunt diagnosed in her 29's   • Other (TB) Mother          age 27   • Other (MVA) Father          age 45      reports that  has never smoked.  she has never used smokeless tobacco. She reports that she does not drink alcohol or use d (LMP Unknown)   SpO2 99%   BMI 21.50 kg/m²   GENERAL: The patient is sitting up in chair in no acute distress. HEENT: Unremarkable. NECK: Supple. LUNGS: non-labored respirations  HEART: S1 and S2; regular rate and rhythm.   ABDOMEN: The abdomen is sof hyperlipidemia     CLL (chronic lymphocytic leukemia) (HCC)     Osteopenia     Long term (current) use of anticoagulants     Thoracic compression fracture (HCC)     Osteoporosis     Monitoring for long-term anticoagulant use     Thrombocytopenia ([facility] [address]

## 2019-03-01 NOTE — PROGRESS NOTES
[facility] Dosing Service  Warfarin (Coumadin) Subsequent Dosing    Jonas Pratt is a 80year old female for whom pharmacy has been dosing warfarin (Coumadin).  Goal INR is 2-3    Recent Labs   Lab  02/25/19   0932  02/26/19   0638  02/27/19   0703  0

## 2019-03-01 NOTE — CM/SW NOTE
MSW paged [facility] with referral to assess patient for [facility] services as she has a hx with them and would benefit from service. MSW will await response from [facility].     Ryan Ledbetter LCSW

## 2019-03-01 NOTE — PLAN OF CARE
GENITOURINARY - ADULT    • Absence of urinary retention Not Progressing          DISCHARGE PLANNING    • Discharge to home or other facility with appropriate resources Progressing        Impaired Functional Mobility    • Achieve highest/safest level of mob

## 2019-03-01 NOTE — PROGRESS NOTES
[facility]  Cardiology Progress Note    Sara Valadez Patient Status:  Inpatient    1924 MRN [id number]   [facility] 5NW-A Attending [facility]   Hosp Day # 4 PCP Asmita Feliciano MD     Assessment:  Aspiration pne chest wall expansion  Abdomen: Soft, non-tender, non-distended. No hepatosplenomegaly, masses  Extremities:  Peripheral pulses are  2+, 1+ BLE edema.   Neurologic: Alert and oriented,  Gross motor and sensory modalities preserved  Psychiatric: Normal mood

## 2019-03-01 NOTE — PROGRESS NOTES
Pulmonary Progress Note        NAME: [place] - ROOM: 946/742-Q - MRN: [id number] - Age: 80year old - : 1924        Last 24hrs: No events overnight, resp status is better    OBJECTIVE:   19  2139 19  0615 19  0715 19 103   CO2   --   33.0*   BUN   --   15   CA   --   8.5       No results for input(s): ALT, AST, ALB, AMYLASE, LIPASE, LDH in the last 72 hours.     Invalid input(s): ALPHOS, TBIL, DBIL, TPROT    Invalid input(s): ARTERIALPH, ARTERIALPO2, ARTERIALPCO2, ANDI

## 2019-03-01 NOTE — PROGRESS NOTES
[facility] Hospitalist Progress Note                                                                   [address]  1/17/1924    SUBJECTIVE:     UOP low last 8hrs- PO intake down it seems rela Nightly   • metoprolol Tartrate  25 mg Oral 2x Daily(Beta Blocker)   • Losartan Potassium  100 mg Oral Daily   • PARoxetine HCl  10 mg Oral Nightly   • traZODone HCl  100 mg Oral Nightly   • piperacillin-tazobactam  3.375 g Intravenous Q8H     Continuous I

## 2019-03-01 NOTE — OCCUPATIONAL THERAPY NOTE
OCCUPATIONAL THERAPY EVALUATION - INPATIENT     Room Number: 912/937-D  Evaluation Date: 3/1/2019  Type of Evaluation: Initial  Presenting Problem: PNA    Physician Order: IP Consult to Occupational Therapy  Reason for Therapy: ADL/IADL Dysfunction and Dis stays with her during the day until she goes to bed. Pt reports she bathes and dresses indep most of the time but sometimes has help especially for socks and shoes and uses a RW to walk down to meals. Her dtr assists her ADLs as needed.  Pt reports she rece Impairment: 50.11%  Standardized Score (AM-PAC Scale): 37.26  CMS Modifier (G-Code): CK    FUNCTIONAL TRANSFER ASSESSMENT  Supine to Sit : Minimum assistance  Sit to Stand: Minimum assistance    Skilled Therapy Provided: Pt was received supine in bed for s Discharge Recommendations: Home with home health PT/OT       PLAN  OT Treatment Plan: Balance activities; Energy conservation/work simplification techniques;ADL training;IADL training;Continued evaluation; Compensatory technique education;Equipment eval/educ

## 2019-03-01 NOTE — SLP NOTE
SPEECH DAILY NOTE - INPATIENT    ASSESSMENT & PLAN   ASSESSMENT  Pt seen for dysphagia tx to assess tolerance with recommended diet, ensure appropriate utilization of aspiration precautions and provide pt/family education.  Pt found sitting upright in bed f 2 sessions.  Goal met               FOLLOW UP  Follow Up Needed: No  SLP Follow-up Date: 03/01/19  Number of Visits to Meet Established Goals: 1    Session: 2    If you have any questions, please contact Clifton Asp

## 2019-03-01 NOTE — PLAN OF CARE
Pt alert and oriented, denies any pain. She requires O2 at 2L/min prn. Able to ambulate with walker but gets tired easily. She has been unable to void and continues to need to be straight catheterized. Bladder scan at 1920 only recorded 60ml.  Night shift R

## 2019-03-01 NOTE — CM/SW NOTE
MSW met with the patient and daughter Frieda Real. They would like to use [address] upon dc. [facility] referral sent. Esteban Connelly, [facility]    ADDENDUM:  MSW spoke with [address].   They will be calling the daughter Frieda Real to schedule an funmi

## 2019-03-01 NOTE — PHYSICAL THERAPY NOTE
PHYSICAL THERAPY TREATMENT NOTE - INPATIENT    Room Number: 668/662-M     Session: 2  Number of Visits to Meet Established Goals: 5    Presenting Problem: Shortness of breath; acute respiratory distress    History related to current admission: Pt was admi SPO2 began to drop to high 80s. O2 WALK   Received on 2LO2 NC maintaining SPO2 >90%. Began amb with 2LO2 and started to drop to 87, 86% and increased to 4LO2 for remainder of walk to maintain >90%.  Maintained 89-90% after increased to RW after a couple initial VC's at start of amb. However, maintained significant cervical flexion t/o amb and required constant VC's to look up and see where she was walking to prevent collision with wall or other obstacles.  Pt inconsistently looked at wher assistance level: supervision.  Goal met.       Goal #3 Patient is able to ambulate 50 feet with assist device: walker - rolling at assistance level: supervision      Goal #4 Patient is able to demonstrate PLB technique @ independent.    Goal #5     Goal #6

## 2019-03-02 NOTE — PLAN OF CARE
Pt is alert and oriented but can be slightly forgetful. Cooperative with care and with safety measures. Daughters pursuing hospice care upon discharge. Pt is in agreement.  She was still unable to void this morning, Anthony catheter placed with low urine outp

## 2019-03-02 NOTE — PROGRESS NOTES
[facility] Dosing Service  Warfarin (Coumadin) Subsequent Dosing    Dearl Baldev is a 80year old female for whom pharmacy has been dosing warfarin (Coumadin).  Goal INR is 2-3    Recent Labs   Lab  02/26/19   0638  02/27/19   0703  02/28/19   0722  0

## 2019-03-02 NOTE — PROGRESS NOTES
NURSING DISCHARGE NOTE    Discharged Home via Wheelchair. Accompanied by Family member and Support staff  Belongings Taken by patient/family. Pt and family received discharge instructions and education. PIV removed. Anthony catheter remains in place.

## 2019-03-02 NOTE — PROGRESS NOTES
[facility] Hospitalist Progress Note                                                                   [address]  1/17/1924    SUBJECTIVE:     Family would like to transition to hospice care Donepezil HCl  5 mg Oral Nightly   • metoprolol Tartrate  25 mg Oral 2x Daily(Beta Blocker)   • Losartan Potassium  100 mg Oral Daily   • PARoxetine HCl  10 mg Oral Nightly   • traZODone HCl  100 mg Oral Nightly   • piperacillin-tazobactam  3.375 g Azeb Sickle

## 2019-03-02 NOTE — PLAN OF CARE
Received patient a/ox4, forgetful. Patient had a difficult time sleeping d/t coughing. She has a wet sounding weak, non productive cough. She was given a neb in the middle of the night and had some improvement with that. HOB >30.  She was updated on plan of

## 2019-03-04 NOTE — CM/SW NOTE
03/04/19 0900   Discharge disposition   Expected discharge disposition Hospice/Home   Name of [facility] [address]     Patient discharged on 03/02/2019 as previously planned.

## 2019-03-13 NOTE — DISCHARGE SUMMARY
General Medicine Discharge Summary     Patient ID:  Richa Ricketts year old  1/17/1924    Admit date: 2/25/2019    Discharge date and time:  3/2/19    Attending Physician: No att. providers found CONSULT TO SOCIAL WORK  IP CONSULT TO PHYSICAL THERAPY  NURSING CONSULT TO DIETITIAN  IP CONSULT TO UROLOGY  IP CONSULT TO HOSPICE    Operative Procedures:        Patient instructions:      Discharge Medication List as of 3/2/2019  2:28 PM    START taking

## 2019-04-15 NOTE — PROGRESS NOTES
[facility]  Cardiology Progress Note    Dallin Soto Patient Status:  Inpatient    1924 MRN [id number]   [facility] 7NE-A Attending Cassy Vidal MD   [address] Day # 2 PCP Nicholas Pappas MD     Assessment:  Aspiration pneum Nutrition Assessment    Type and Reason for Visit: Reassess    Nutrition Recommendations: Continue cardiac, 2 gm Na diet w/ 1800 mL FR. Continue magic cup supplements 3x/d and Nepro supplements 4x/d. Continue appetite stimulant. Nutrition Assessment:  Pt declining from a nutritional standpoint aeb ongoing poor po intake of 50% of meals/supplements per pt,12.8% wt loss x 6 weeks. Pt now meets the criteria for severe malnutrition. Megace started today. Pt remains on HD and plans for perm cath placement on Thursday. Will continue supplements 2/2 severe malnutrition. Goal of wt stabilization as medically able. Malnutrition Assessment:  · Malnutrition Status: Meets the criteria for severe malnutrition  · Context: (Acute on Chronic)  · Findings of the 6 clinical characteristics of malnutrition (Minimum of 2 out of 6 clinical characteristics is required to make the diagnosis of moderate or severe Protein Calorie Malnutrition based on AND/ASPEN Guidelines):  1. Energy Intake-Less than or equal to 50% of estimated energy requirement, Greater than or equal to 7 days    2. Weight Loss-7.5% loss or greater, in 1 month  3. Fat Loss-No significant subcutaneous fat loss,   4. Muscle Loss-No significant muscle mass loss,    5. Fluid Accumulation-Mild fluid accumulation, Generalized, Extremities  6.   Strength-Not measured    Nutrition Risk Level: High    Nutrient Needs:  · Estimated Daily Total Kcal: 30-35 kcal/kg ~>3521-7239 kcals/d   · Estimated Daily Protein (g): 1.2-1.5 gm/kg ~>74-93 gms/d     Nutrition Diagnosis:   · Problem: Severe malnutrition  · Etiology: related to Insufficient energy/nutrient consumption, Catabolic illness(decreased appetite)     Signs and symptoms:  as evidenced by Intake 25-50%, Localized or generalized fluid accumulation(<50% of est'd needs x 7 days)    Objective Information:  · Nutrition-Focused Physical Findings: active bowel sounds  · Wound Type: Multiple, Open Wounds  · Current rales. good chest wall expansion  Abdomen: Soft, thin, non-tender, non-distended. No hepatosplenomegaly, masses  Extremities:  Peripheral pulses are  2+, no edema.   Neurologic: Alert and oriented,  Gross motor and sensory modalities preserved  Psychiatric

## 2021-07-05 NOTE — PLAN OF CARE
CARDIOVASCULAR - ADULT    • Maintains optimal cardiac output and hemodynamic stability Progressing    • Absence of cardiac arrhythmias or at baseline Progressing        Impaired Functional Mobility    • Achieve highest/safest level of mobility/gait Progres Albuterol (Eqv-ProAir HFA) 90 mcg/inh inhalation aerosol: 2 puff(s) inhaled every 6 hours  albuterol 2.5 mg/3 mL (0.083%) inhalation solution: 3 milliliter(s) inhaled every 6 hours  Constulose 10 g/15 mL oral liquid: 15 milliliter(s) orally once a day  Milk of Magnesia 8% oral suspension: 15 milliliter(s) orally once a day (at bedtime)  nystatin 100,000 units/mL oral suspension: 5 milliliter(s) orally 3 times a day  PHENobarbital 64.8 mg oral tablet: 1 tab(s) orally once a day via G tube  tamsulosin 0.4 mg oral capsule: 1 cap(s) orally once a day (at bedtime)  zinc oxide topical dressing: Apply topically to affected area 2 times a day, As Needed after soiling episodes

## 2022-01-05 NOTE — PLAN OF CARE
Problem: DISCHARGE PLANNING - CASE MANAGEMENT  Goal: Discharge to post-acute care or home with appropriate resources  INTERVENTIONS:  - Conduct assessment to determine patient/family and health care team treatment goals, and need for post-acute services ba Complex Repair Preamble Text (Leave Blank If You Do Not Want): Extensive wide undermining was performed.

## 2025-01-05 NOTE — CONSULTS
[address] [facility] Dosing Service  Warfarin (Coumadin) Subsequent Dosing    Bryn Casiano is a 80year old female with a h/o afib for whom pharmacy has been dosing warfarin (Coumadin) per consult from Dr Gilberto Hazel.  Goal INR is 2-3    Recent Labs   Lab  09/23/1 Noted hx  Mood stable, good spirits  Monitor off anxiolytic meds  Supportive care  Psych following at facility

## (undated) NOTE — IP AVS SNAPSHOT
[facility] [place]  [address] [place], [address] ~ [phone]                Discharge Summary   3/25/2017    Kandace Messina           Admission Information        Provider Department    3/25/2017 Addison Briggs MD  5nw-A Generic drug:  Olmesartan Medoxomil        Take 40 mg by mouth daily.                             CARTIA  MG Cp24   Last time this was given:  180 mg on 3/27/2017  9:30 AM   Generic drug:  DilTIAZem HCl ER Coated Beads   Next dose due:  3/28/17 HOLD COUMADIN TONIGHT 3/27/2017    CT scan to be scheduled for 10-14 days from discharge date, f/u in office in 2 weeks with Dr Mica Gambino to discuss results of CT scan    [facility]    P:[phone]  F:[phone]      Discharge References Is Patient allergic to iodinated contrast or has patient had a SEVERE reaction to anything?:      Does patient have poor kidney function or elevated Creatinine/BUN levels?:        Immunization History as of 3/27/2017  Never Reviewed    INFLUENZA 11/1/2016 6.9 (03/25/17)  3.0 (L) (03/26/17)  137 (03/26/17)  4.1 (03/26/17)  101      Radiology Exams     None         Additional Information       We are concerned for your overall well being:    - If you are a smoker or have smoked in the last 12 months, we encou Blood Pressure and Cardiac Medications     metoprolol Tartrate 25 MG Oral Tab    DilTIAZem HCl ER Coated Beads (CARTIA XT) 180 MG Oral Capsule SR 24 Hr    Olmesartan Medoxomil (BENICAR) 40 MG Oral Tab         Use: Treat abnormal blood pressure (high or low

## (undated) NOTE — MR AVS SNAPSHOT
After Visit Summary   2/7/2018    Amauri Borden    MRN: [id number]           Visit Information     Date & Time  2/7/2018 12:00 PM Provider   [facility],1St Floor Reference Lab Dept.  Phone  [phone]      Allergies as of 2/7 headache and pink eye. The cost for a Video Visit is currently $35.         If you receive a survey from [employer], please take a few minutes to complete it and provide feedback.  We strive to deliver the best patient experience and are looking for ways visit: [email or web address] or call 1.701. MY. ([phone])

## (undated) NOTE — MR AVS SNAPSHOT
After Visit Summary   12/17/2018    Dallin Soto    MRN: [id number]           Visit Information     Date & Time  12/17/2018 10:15 AM Provider   [facility],1St Floor Reference Lab Dept.  Phone  [phone] If you receive a survey from [employer], please take a few minutes to complete it and provide feedback. We strive to deliver the best patient experience and are looking for ways to make improvements. Your feedback will help us do so.  For more information

## (undated) NOTE — MR AVS SNAPSHOT
After Visit Summary   1/25/2019    Kandace Messina    MRN: [id number]           Visit Information     Date & Time  1/25/2019  9:00 AM Provider   [facility],1St Floor Reference Lab Dept.  Phone  [phone] Were complete it and provide feedback. We strive to deliver the best patient experience and are looking for ways to make improvements. Your feedback will help us do so. For more information on [employer], please visit [email or web address]

## (undated) NOTE — ED AVS SNAPSHOT
Marichuy Cheney   MRN: [id number]    Department:  [facility] Emergency Department   Date of Visit:  11/19/2018           Disclosure     Insurance plans vary and the physician(s) referred by the ER may not be covered by your plan.  Please contact y tell this physician (or your personal doctor if your instructions are to return to your personal doctor) about any new or lasting problems. The primary care or specialist physician will see patients referred from the [facility] Emergency Department.  Dirk Carlos

## (undated) NOTE — MR AVS SNAPSHOT
After Visit Summary   11/14/2018    Jeanie Rodas    MRN: [id number]           Visit Information     Date & Time  11/14/2018 10:45 AM Provider   [facility],1St Floor Reference Lab Dept.  Phone  [phone] Heather Chamorro [address] [address]     Dear [place] :    Thank you for enrolling in [address] Please follow the instructions below to securely access your online medical record.  [employer] allows you to send messages to your docto headache and pink eye. The cost for a Video Visit is currently $35.         If you receive a survey from [employer], please take a few minutes to complete it and provide feedback.  We strive to deliver the best patient experience and are looking for ways visit: [email or web address] or call 1.366. MY. ([phone])

## (undated) NOTE — MR AVS SNAPSHOT
After Visit Summary   2/6/2019    Michael Long    MRN: [id number]           Visit Information     Date & Time  2/6/2019 12:45 PM Provider   [facility],1St Floor Reference Lab Dept.  Phone  [phone] Were experience and are looking for ways to make improvements. Your feedback will help us do so. For more information on [employer], please visit [email or web address]                   DO YOU KNOW WHERE TO GO?   Injury & illness are neve

## (undated) NOTE — MR AVS SNAPSHOT
After Visit Summary   1/5/2018    Laura Daniel    MRN: [id number]           Visit Information     Date & Time  1/5/2018 10:00 AM Provider   [facility],1St Floor Reference Lab Dept.  Phone  [phone]      Allergies as of 1/5 headache and pink eye. The cost for a Video Visit is currently $10.         If you receive a survey from [employer], please take a few minutes to complete it and provide feedback.  We strive to deliver the best patient experience and are looking for ways visit: [email or web address] or call [phone]. MY. ([phone])

## (undated) NOTE — MR AVS SNAPSHOT
After Visit Summary   3/30/2017    Michelle Petit    MRN: [id number]           Visit Information        Provider Department Dept Phone    3/30/2017  3:00 AM REF CELESTE LICONA Ref [address] [facility] [phone]      Allergies as of 3/30/2017 5/24/2017 1:50 PM Munira Brochsudha                [facility] now offers Video Visits through [address] for adult and pediatric patients.   Video Visits are available Monday - Friday for many common conditions such as allergies, colds, cough, fever, rash,

## (undated) NOTE — MR AVS SNAPSHOT
After Visit Summary   4/25/2018    Luis Betancourt    MRN: [id number]           Visit Information     Date & Time  4/25/2018 10:00 AM Provider   [facility],1St Floor Reference Lab Dept.  Phone  [phone]      Allergies as of 4 messages to your doctor, view test results, renew prescriptions and request appointments. How Do I Sign Up? 1. In your Internet browser, go to [email or web address]  2.  Click on the Activate your Account if you have an activation code i experience and are looking for ways to make improvements. Your feedback will help us do so. For more information on [employer], please visit [email or web address]                   DO YOU KNOW WHERE TO GO?   Injury & illness are neve

## (undated) NOTE — LETTER
July 25, 2018    [address] [facility] [place] [address]      Dear [place]:    The following are the results of your recent tests ordered by  [facility]. Please review the list of test results.   Your result is the number on the lef

## (undated) NOTE — IP AVS SNAPSHOT
[facility] [place] [address] [place], [address] ~ [phone]                Discharge Summary   1/13/2017    Jeanie Rodas           Admission Information        Provider Department    1/13/2017 Mohsen Pendleton MD  2ne- PRESERVISION AREDS Tabs        Take 1 tablet by mouth 2 (two) times daily. TraZODone HCl 50 MG Tabs   Last time this was given:  25 mg on 1/15/2017 10:10 PM   Commonly known as:  DESYREL        Take 100 mg by mouth nightly. section for recording your thoughts or questions. Things for You to Remember:   1. An appointment has been made for you to see your doctor or healthcare provider within 7 days of hospital discharge.  It is important that you attend this appointment to m Why:  Follow up with Dr. Dominic Pressley on 1/25 at 1:00    Contact information:    [address]        Future Appointments     Jan 23, 2017  [facility] with Kishan Kebede MD   SP CARDIOLOGY (Dorota Carroll 0.79 (H) (01/12/17)  0.03 (01/12/17)  5.61      Metabolic Lab Results  (Last result in the past 90 days)    HgbA1C Glucose BUN Creatinine Calcium Alkaline Phosph AST    -- (01/16/17)  99 (01/16/17)  31 (H) (01/16/17)  1.12 (H) (01/16/17)  8.9 (01/13/17)  8 provide you with additional printed information. Not all patients will experience these side effects or respond to medications the same. Please call your provider or healthcare team if you have any questions regarding your medications while at home.

## (undated) NOTE — MR AVS SNAPSHOT
After Visit Summary   10/17/2018    Susie Moss    MRN: [id number]           Visit Information     Date & Time  10/17/2018 11:15 AM Provider   [facility],1St Floor Reference Lab Dept.  Phone  [phone] [facility] [address]     Dear [place] :    Thank you for enrolling in [address] Please follow the instructions below to securely access your online medical record.  [employer] allows you to send messages to your doctor, view test result headache and pink eye. The cost for a Video Visit is currently $35.         If you receive a survey from [employer], please take a few minutes to complete it and provide feedback.  We strive to deliver the best patient experience and are looking for ways visit: [email or web address] or call 1.539. MY. ([phone])

## (undated) NOTE — IP AVS SNAPSHOT
[facility] [place] [address] [place], [address] ~ [phone]                Discharge Summary   4/16/2017    Jerrod Mcclelland           Admission Information        Provider Department    4/16/2017 Torsten Groves MD  8ne-A - Another medication with the same name was removed. Continue taking this medication, and follow the directions you see here. Next dose due:  4/21 pm        Take 1 tablet (20 mg total) by mouth BID (Diuretic).     Candelaria Skelton        8 am           4 pm Where to Get Your Medications      These medications were sent to [facility], [address]  [address], [phone], [phone] [address] [facility] [place] [address]     Phone:  [phone] 4. Some exercise and activity is important to help keep your heart functioning and strong. Unless instructed not to exercise, you may walk at a slow to moderate pace for 10-15 minutes 2-3 days per week to start.  Pace your activity to prevent shortness of b 9. Numbness/tingling  10. Difficulty urinating or defecating  11. bleeding    Do not drive when taking pain medications  Do not exceed 4 grams acetaminophen within 24 hours      Follow-up Information     Follow up with Julieta Gilliland MD On 5/1/2017.     Sp Recent Hematology Lab Results (cont.)  (Last 3 results in the past 90 days)    Neutrophil % Lymphocyte % Monocyte % Eosinophil % Basophil % Prelim Neut Abs Final Neut Abs Lymphocyte Abso Monocyte Absolu Eosinophil Abso Basophil Absolu    (04/20/17)  39.1 ( Patient [address] to help you get signed up for insurance coverage. Patient [address] is a Federal Navigator program that can help with your Affordable Care Act coverage, as well as all types of Medicaid plans.   To get signed up and covere Use: Prevent the development or progression of blood clots   Most common side effects: Abnormal bleeding   What to report to your healthcare team: Bruising, blood in urine or stool, or nosebleeds             Non-Narcotic Pain Medications     acetaminophen

## (undated) NOTE — IP AVS SNAPSHOT
Patient Demographics     Address  [facility] [address] [address] Phone  [phone] [facility]) *Preferred*  [phone] [facility])      Emergency Contact(s)     Name Relation Home Work Aiden Sanchez Daughter [phone] 61 traZODone HCl 50 MG Tabs  Commonly known as:  DESYREL  Next dose due:  3/2 bedtime       Take 100 mg by mouth nightly.                 Where to Get Your Medications      These medications were sent to [address] [place], [place] [address]Genoveva Caren [facility] Most Recent Value   Vitals  123/38 Filed at 03/02/2019 0750   Pulse  53 Filed at 03/02/2019 0750   Resp  16 Filed at 03/02/2019 0750   Temp  98.1 °F (36.7 °C) Filed at 03/02/2019 0750   SpO2  100 % Filed at 03/02/2019 0750      Patient's Most Recent Weigh Respiratory Panel FLU expanded Once [[id number]]  (Normal) Collected:  02/25/19 1006    Order Status:  Completed Lab Status:  Final result Updated:  02/25/19 1150    Specimen:  Other from Nasopharyngeal swab      Adenovirus PCR: Negative     Coronavirus 22 • Macular degeneration of right eye    • MR (mitral regurgitation)     Moderate    • Osteoarthritis    • Osteopenia    • Osteoporosis 10/2014   • Pneumonia due to organism    • Thoracic compression fracture ([address] 10/2014    • Valvular disease    • Visual i Extremities: Extremities normal, atraumatic, no cyanosis or edema. Skin: Skin color, texture, turgor normal. No rashes or lesions.     Neurologic: Normal strength, no focal deficit appreciated     Data Review:    LABS:   Lab Results   Component Value Date - cont IV zosyn, BD    Chronic diastolic CHF, mild to mod MR  - cards to see  - diuresis per them    afib  - cont coumadin  - monitor on tele  - cont cardizem, BB     Prev:  Cont coumadin       Outpatient records or previous hospital records reviewed.    DM Chronic diastolic CHF-clinically euvolemic so will continue her oral Bumex.     DNR/DNI        Plan:  -Abx per pulm MD  -continue BP meds  -pharmacy to dose coumadin  -no need for Lovenox for DVT prophylaxis  -daily INR  -continue oral Bumex        History History:  Past Medical History:   Diagnosis Date   • Arrhythmia    • ATRIAL FIBRILLATION    • Cataract    • Congestive heart disease [address] [address]    • Diverticulitis    • High blood pressure    • High cholesterol    • HYPERLIPIDEMIA    • HYPERTENSION    • Ins •  Metoclopramide HCl (REGLAN) injection 5 mg, 5 mg, Intravenous, Q8H PRN  •  ipratropium-albuterol (DUONEB) nebulizer solution 3 mL, 3 mL, Nebulization, QID  •  bumetanide (BUMEX) tab 1 mg, 1 mg, Oral, Daily  •  diltiazem (CARDIZEM CD) 24 hr cap 180 mg, 1 lesion.          Labs:   Recent Labs      02/25/19 0823 02/25/19 0932   WBC  22.2*   --    HGB  13.7   --    MCV  92.7   --    PLT  136.0*   --    INR   --   1.88*     Recent Labs      02/25/19 0823 02/25/19 0932   NA  134*   --    K   --   3.9 History related to current admission: Pt was admitted [address] 2/25/2019 with SOB, hypoxia. Pt has a past medical history significant for Afib, HTN, CHF, macular degeneration.  See below for detailed past medical/surigcal history.  maintain >90%. Maintained 89-90% after increased to 4L.  Returned to 2 LO2 NC once sitting in bed side chair and recovery SPO2 post amb 91-92%     SPO2 Ambulation on Oxygen: (amb: 88-91 4 LO2 NC)  Ambulation oxygen flow (liters per minute): 4        AM-PAC prevent collision with wall or other obstacles. Pt inconsistently looked at where she was amb c RW following VC's. Pt initially on 2LO2 NC and increased to 4LO2 NC for remainder of walk when SPO2 started to drop to 86-87%.  Educated on breathing techniques Goal #4 Patient is able to demonstrate PLB technique @ independent.    Goal #5     Goal #6     Goal Comments: Goals established on 2/27/2019. Goal #1 modified and all goals ongoing 03/01/19. [MK.1]            Attribution [place]    [address] 1 - Noe Mayorga on 3/1/ bladder sling   • OTHER SURGICAL HISTORY  years after hyst    cystocele repair       SUBJECTIVE  \"Which way do I go! \"    Patient’s self-stated goal is none stated.      OBJECTIVE  Precautions: Aspiration    WEIGHT BEARING RESTRICTION  Weight Bearing Rest Skilled Therapy Provided: Approached sup in bed, just finished breakfast and agreeable to therapy. Sup>sit c increased time for UE sequencing c Sup.  Once sitting EOB, required significant VC's to scoot fwd c multiple small scoots until feet placed on f mobility and education on breathing techniques. Will continue to benefit from skilled PT services to increase consistency c breathing techniques, improve tolerance of functional mobility and amb, and strengthening. Will continue to recommend HHPT upon D/C. [place] on 2/25/2019 with SOB, hypoxia. Pt has a past medical history significant for Afib, HTN, CHF, macular degeneration. See below for detailed past medical/surigcal history. [KP. 2]    Problem List  Principal Problem:    Nosocomial pneumonia  Activ Pt stated, \"I walk when ever they ask me to. \"[KP.2]    Patient self-stated goal is[KP. 1] to go home[KP. 2]     OBJECTIVE  Precautions: Aspiration  Fall Risk: High fall risk    WEIGHT BEARING RESTRICTION  Weight Bearing Restriction: None                HERRERA Skilled Therapy Provided:[KP.1] Pt was received supine in bed for session, pt required min assist to t/f to EOB, therapist completed MMT and ROM on pt, pt completed donning of socks with max assist, pt completed sit to stand with min assist, pt was able to OT Treatment Plan: Balance activities; Energy conservation/work simplification techniques;ADL training;IADL training;Continued evaluation; Compensatory technique education;Equipment eval/education;Patient/Family training;Patient/Family education; Endurance tr Filed:  3/1/2019  3:18 PM Date of Service:  3/1/2019  3:13 PM Status:  Signed    :  Mary Lou Skinner SLP (SPEECH-LANGUAGE PATHOLOGIST)       SPEECH DAILY NOTE - INPATIENT    ASSESSMENT & PLAN   ASSESSMENT  Pt seen for dysphagia tx to assess myles Goal #3 The patient will utilize compensatory strategies as outlined by  VFSS including Slow rate, Small bites, Small sips, No straws with min-mod assistance 95 % of the time across 2 sessions.  Goal met             FOLLOW UP  Follow Up Needed: No  SLP Fol

## (undated) NOTE — MR AVS SNAPSHOT
After Visit Summary   12/26/2018    Alex Michael    MRN: [id number]           Visit Information     Date & Time  12/26/2018  9:15 AM Provider   [facility],1St Floor Reference Lab Dept.  Phone  [phone] If you receive a survey from [employer], please take a few minutes to complete it and provide feedback. We strive to deliver the best patient experience and are looking for ways to make improvements. Your feedback will help us do so.  For more information